# Patient Record
Sex: FEMALE | Race: BLACK OR AFRICAN AMERICAN | NOT HISPANIC OR LATINO | Employment: FULL TIME | URBAN - METROPOLITAN AREA
[De-identification: names, ages, dates, MRNs, and addresses within clinical notes are randomized per-mention and may not be internally consistent; named-entity substitution may affect disease eponyms.]

---

## 2023-03-20 ENCOUNTER — DOCUMENTATION (OUTPATIENT)
Dept: CARE COORDINATION | Facility: CLINIC | Age: 28
End: 2023-03-20
Payer: MEDICAID

## 2023-03-21 LAB
ALANINE AMINOTRANSFERASE (SGPT) (U/L) IN SER/PLAS: 21 U/L (ref 7–45)
ALBUMIN (G/DL) IN SER/PLAS: 3.3 G/DL (ref 3.4–5)
ALKALINE PHOSPHATASE (U/L) IN SER/PLAS: 42 U/L (ref 33–110)
ANION GAP IN SER/PLAS: 10 MMOL/L (ref 10–20)
ASPARTATE AMINOTRANSFERASE (SGOT) (U/L) IN SER/PLAS: 12 U/L (ref 9–39)
BASOPHILS (10*3/UL) IN BLOOD BY AUTOMATED COUNT: 0.02 X10E9/L (ref 0–0.1)
BASOPHILS/100 LEUKOCYTES IN BLOOD BY AUTOMATED COUNT: 0.5 % (ref 0–2)
BILIRUBIN TOTAL (MG/DL) IN SER/PLAS: 0.5 MG/DL (ref 0–1.2)
CALCIUM (MG/DL) IN SER/PLAS: 8.9 MG/DL (ref 8.6–10.3)
CARBON DIOXIDE, TOTAL (MMOL/L) IN SER/PLAS: 27 MMOL/L (ref 21–32)
CHLORIDE (MMOL/L) IN SER/PLAS: 102 MMOL/L (ref 98–107)
CREATININE (MG/DL) IN SER/PLAS: 0.56 MG/DL (ref 0.5–1.05)
EOSINOPHILS (10*3/UL) IN BLOOD BY AUTOMATED COUNT: 0.12 X10E9/L (ref 0–0.7)
EOSINOPHILS/100 LEUKOCYTES IN BLOOD BY AUTOMATED COUNT: 2.9 % (ref 0–6)
ERYTHROCYTE DISTRIBUTION WIDTH (RATIO) BY AUTOMATED COUNT: 15.1 % (ref 11.5–14.5)
ERYTHROCYTE MEAN CORPUSCULAR HEMOGLOBIN CONCENTRATION (G/DL) BY AUTOMATED: 31.7 G/DL (ref 32–36)
ERYTHROCYTE MEAN CORPUSCULAR VOLUME (FL) BY AUTOMATED COUNT: 88 FL (ref 80–100)
ERYTHROCYTES (10*6/UL) IN BLOOD BY AUTOMATED COUNT: 4.04 X10E12/L (ref 4–5.2)
GFR FEMALE: >90 ML/MIN/1.73M2
GLUCOSE (MG/DL) IN SER/PLAS: 83 MG/DL (ref 74–99)
HEMATOCRIT (%) IN BLOOD BY AUTOMATED COUNT: 35.7 % (ref 36–46)
HEMOGLOBIN (G/DL) IN BLOOD: 11.3 G/DL (ref 12–16)
IMMATURE GRANULOCYTES/100 LEUKOCYTES IN BLOOD BY AUTOMATED COUNT: 0.2 % (ref 0–0.9)
LEUKOCYTES (10*3/UL) IN BLOOD BY AUTOMATED COUNT: 4.1 X10E9/L (ref 4.4–11.3)
LYMPHOCYTES (10*3/UL) IN BLOOD BY AUTOMATED COUNT: 1.61 X10E9/L (ref 1.2–4.8)
LYMPHOCYTES/100 LEUKOCYTES IN BLOOD BY AUTOMATED COUNT: 39.1 % (ref 13–44)
MONOCYTES (10*3/UL) IN BLOOD BY AUTOMATED COUNT: 0.41 X10E9/L (ref 0.1–1)
MONOCYTES/100 LEUKOCYTES IN BLOOD BY AUTOMATED COUNT: 10 % (ref 2–10)
NEUTROPHILS (10*3/UL) IN BLOOD BY AUTOMATED COUNT: 1.95 X10E9/L (ref 1.2–7.7)
NEUTROPHILS/100 LEUKOCYTES IN BLOOD BY AUTOMATED COUNT: 47.3 % (ref 40–80)
PLATELETS (10*3/UL) IN BLOOD AUTOMATED COUNT: 462 X10E9/L (ref 150–450)
POTASSIUM (MMOL/L) IN SER/PLAS: 4.4 MMOL/L (ref 3.5–5.3)
PROTEIN TOTAL: 7.2 G/DL (ref 6.4–8.2)
SODIUM (MMOL/L) IN SER/PLAS: 135 MMOL/L (ref 136–145)
UREA NITROGEN (MG/DL) IN SER/PLAS: 8 MG/DL (ref 6–23)

## 2023-06-28 ENCOUNTER — APPOINTMENT (OUTPATIENT)
Dept: LAB | Facility: LAB | Age: 28
End: 2023-06-28
Payer: MEDICAID

## 2023-06-28 LAB
ALBUMIN (G/DL) IN SER/PLAS: 4.5 G/DL (ref 3.4–5)
ANION GAP IN SER/PLAS: 11 MMOL/L (ref 10–20)
ANTICARDIOLIPIN IGA ANTIBODY: 1.3 APL U/ML (ref 0–20)
ANTICARDIOLIPIN IGG ANTIBODY: 5.8 GPL U/ML (ref 0–20)
ANTICARDIOLIPIN IGM ANTIBODY: <0.2 MPL U/ML (ref 0–20)
BETA 2 GLYCOPROTEIN 1 IGA AB IN SERUM: <0.6 U/ML (ref 0–20)
BETA 2 GLYCOPROTEIN 1 IGG AB IN SERUM: <1.4 U/ML (ref 0–20)
BETA 2 GLYCOPROTEIN 1 IGM ANTIBODY IN SERUM: <0.2 U/ML (ref 0–20)
CALCIUM (MG/DL) IN SER/PLAS: 9.7 MG/DL (ref 8.6–10.6)
CARBON DIOXIDE, TOTAL (MMOL/L) IN SER/PLAS: 24 MMOL/L (ref 21–32)
CHLORIDE (MMOL/L) IN SER/PLAS: 107 MMOL/L (ref 98–107)
CREATININE (MG/DL) IN SER/PLAS: 0.79 MG/DL (ref 0.5–1.05)
GFR FEMALE: >90 ML/MIN/1.73M2
GLUCOSE (MG/DL) IN SER/PLAS: 89 MG/DL (ref 74–99)
PHOSPHATE (MG/DL) IN SER/PLAS: 4.3 MG/DL (ref 2.5–4.9)
POTASSIUM (MMOL/L) IN SER/PLAS: 3.9 MMOL/L (ref 3.5–5.3)
SODIUM (MMOL/L) IN SER/PLAS: 138 MMOL/L (ref 136–145)
UREA NITROGEN (MG/DL) IN SER/PLAS: 13 MG/DL (ref 6–23)

## 2023-06-29 LAB
DILUTE RUSSELL VIPER VENOM TIME CONF: 0.93 RATIO
DILUTE RUSSELL VIPER VENOM TIME SCREEN: 0.99 RATIO
DILUTE RUSSELL VIPER VENOM TIME TEST RATIO: 1.06 RATIO
LUPUS ANTICOAGULANT INTERPRETATION: NORMAL
NORMALIZED SILICA CLOTTING TIME (RATIO) OF PPP: 0.9 RATIO
SILICA CLOTTING TIME CONFIRMATION: 0.89 RATIO
SILICA CLOTTING TIME SCREEN: 0.81 RATIO

## 2023-09-22 PROBLEM — R76.0 LUPUS ANTICOAGULANT POSITIVE: Status: ACTIVE | Noted: 2023-09-22
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
## 2023-09-22 PROBLEM — D72.828 NEUTROPHILIC LEUKOCYTOSIS: Status: ACTIVE | Noted: 2023-09-22

## 2023-09-22 PROBLEM — R78.81 BACTEREMIA DUE TO GRAM-POSITIVE BACTERIA: Status: ACTIVE | Noted: 2023-09-22

## 2023-09-22 PROBLEM — I38 ENDOCARDITIS OF NATIVE VALVE: Status: ACTIVE | Noted: 2023-09-22
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
## 2023-09-22 PROBLEM — F11.90 OPIOID USE: Status: ACTIVE | Noted: 2023-03-03

## 2023-09-22 PROBLEM — I51.89 CARDIAC MASS: Status: ACTIVE | Noted: 2023-09-22

## 2023-09-22 PROBLEM — J18.9 CAP (COMMUNITY ACQUIRED PNEUMONIA): Status: ACTIVE | Noted: 2023-09-22

## 2023-09-22 PROBLEM — D72.9 NEUTROPHILIC LEUKOCYTOSIS: Status: ACTIVE | Noted: 2023-09-22

## 2023-09-22 PROBLEM — J90 PLEURAL EFFUSION: Status: ACTIVE | Noted: 2023-09-22

## 2023-09-22 PROBLEM — I51.3 INTRACARDIAC THROMBUS: Status: ACTIVE | Noted: 2023-09-22

## 2023-09-22 PROBLEM — Z78.9 USES BIRTH CONTROL: Status: ACTIVE | Noted: 2023-09-22

## 2023-09-22 PROBLEM — J03.90 TONSILLITIS: Status: ACTIVE | Noted: 2023-09-22

## 2023-09-22 PROBLEM — S99.919A ANKLE INJURY: Status: ACTIVE | Noted: 2023-09-22

## 2023-09-22 PROBLEM — Z04.9 CONDITION NOT FOUND: Status: ACTIVE | Noted: 2023-09-22

## 2023-09-22 PROBLEM — I33.0 BACTERIAL ENDOCARDITIS (HHS-HCC): Status: ACTIVE | Noted: 2023-09-22

## 2023-09-22 PROBLEM — Z79.2 LONG TERM (CURRENT) USE OF ANTIBIOTICS: Status: ACTIVE | Noted: 2023-03-03

## 2023-09-22 PROBLEM — D64.9 ANEMIA: Status: ACTIVE | Noted: 2023-03-03

## 2023-09-22 PROBLEM — F13.10: Status: ACTIVE | Noted: 2023-03-03

## 2023-09-22 PROBLEM — I26.99 PULMONARY EMBOLISM (MULTI): Status: ACTIVE | Noted: 2023-03-02

## 2023-09-22 PROBLEM — D72.829 ELEVATED WHITE BLOOD CELL COUNT, UNSPECIFIED: Status: ACTIVE | Noted: 2023-03-03

## 2023-09-22 RX ORDER — HEPARIN SODIUM,PORCINE/PF 10 UNIT/ML
SYRINGE (ML) INTRAVENOUS
COMMUNITY
Start: 2023-03-17

## 2023-09-22 RX ORDER — ACETAMINOPHEN 500 MG
2 TABLET ORAL EVERY 6 HOURS PRN
COMMUNITY

## 2023-09-22 RX ORDER — OXYCODONE HYDROCHLORIDE 5 MG/1
TABLET ORAL
COMMUNITY
Start: 2023-03-15

## 2023-09-22 RX ORDER — PENICILLIN G 2000000 [IU]/50ML
INJECTION, SOLUTION INTRAVENOUS EVERY 4 HOURS
COMMUNITY
Start: 2023-03-17

## 2023-09-22 RX ORDER — PENICILLIN G POTASSIUM 20000000 [IU]/1
POWDER, FOR SOLUTION INTRAVENOUS
COMMUNITY
Start: 2023-03-20

## 2023-09-22 RX ORDER — MEDROXYPROGESTERONE ACETATE 150 MG/ML
1 INJECTION, SUSPENSION INTRAMUSCULAR
COMMUNITY
Start: 2023-04-27 | End: 2023-10-23 | Stop reason: SDUPTHER

## 2023-09-22 RX ORDER — OXYCODONE HYDROCHLORIDE 5 MG/1
1 TABLET ORAL 2 TIMES DAILY
COMMUNITY
Start: 2023-03-17

## 2023-09-22 RX ORDER — SODIUM CHLORIDE 0.9 % (FLUSH) 0.9 %
SYRINGE (ML) INJECTION
COMMUNITY
Start: 2023-03-17

## 2023-09-22 RX ORDER — ENOXAPARIN SODIUM 150 MG/ML
140 INJECTION SUBCUTANEOUS 2 TIMES DAILY
COMMUNITY
Start: 2023-05-12

## 2023-09-22 RX ORDER — LEVONORGESTREL AND ETHINYL ESTRADIOL 0.15-0.03
1 KIT ORAL DAILY
COMMUNITY
Start: 2023-04-12

## 2023-09-22 RX ORDER — NORETHINDRONE ACETATE AND ETHINYL ESTRADIOL 1MG-20(21)
1 KIT ORAL DAILY
COMMUNITY
Start: 2014-04-09

## 2023-09-22 RX ORDER — LEVONORGESTREL / ETHINYL ESTRADIOL AND ETHINYL ESTRADIOL 150-30(84)
1 KIT ORAL DAILY
COMMUNITY

## 2023-10-23 ENCOUNTER — APPOINTMENT (OUTPATIENT)
Dept: OBSTETRICS AND GYNECOLOGY | Facility: CLINIC | Age: 28
End: 2023-10-23
Payer: MEDICAID

## 2023-10-23 DIAGNOSIS — Z78.9 USES BIRTH CONTROL: Primary | ICD-10-CM

## 2023-10-23 RX ORDER — MEDROXYPROGESTERONE ACETATE 150 MG/ML
150 INJECTION, SUSPENSION INTRAMUSCULAR
Qty: 1 ML | Refills: 3 | Status: SHIPPED | OUTPATIENT
Start: 2023-10-23 | End: 2023-11-07 | Stop reason: SDUPTHER

## 2023-10-25 ENCOUNTER — TELEPHONE (OUTPATIENT)
Dept: OBSTETRICS AND GYNECOLOGY | Facility: CLINIC | Age: 28
End: 2023-10-25
Payer: MEDICAID

## 2023-11-02 NOTE — TELEPHONE ENCOUNTER
Spoke to patient on the phone.  Patient states she does get breakthrough bleeding on Depo-Provera.  Now on Xarelto due to history of blood clots.  We discussed limited option of birth control due to history of blood clots.  I did review progesterone only birth control and its many different forms along with nonhormonal IUD.  Patient would prefer not to use IUD and would like to stay on the Depo-Provera.  Prescription sent to her pharmacy on file.

## 2023-11-07 DIAGNOSIS — Z78.9 USES BIRTH CONTROL: ICD-10-CM

## 2023-11-07 RX ORDER — MEDROXYPROGESTERONE ACETATE 150 MG/ML
150 INJECTION, SUSPENSION INTRAMUSCULAR
Qty: 1 ML | Refills: 3 | Status: SHIPPED | OUTPATIENT
Start: 2023-11-07

## 2025-06-25 ENCOUNTER — APPOINTMENT (OUTPATIENT)
Dept: RADIOLOGY | Facility: HOSPITAL | Age: 30
End: 2025-06-25
Payer: MEDICAID

## 2025-06-25 ENCOUNTER — HOSPITAL ENCOUNTER (INPATIENT)
Facility: HOSPITAL | Age: 30
End: 2025-06-25
Attending: STUDENT IN AN ORGANIZED HEALTH CARE EDUCATION/TRAINING PROGRAM
Payer: MEDICAID

## 2025-06-25 ENCOUNTER — APPOINTMENT (OUTPATIENT)
Dept: CARDIOLOGY | Facility: HOSPITAL | Age: 30
End: 2025-06-25
Payer: MEDICAID

## 2025-06-25 DIAGNOSIS — R07.9 CHEST PAIN, UNSPECIFIED TYPE: ICD-10-CM

## 2025-06-25 DIAGNOSIS — R76.0 LUPUS ANTICOAGULANT POSITIVE: ICD-10-CM

## 2025-06-25 DIAGNOSIS — F32.A DEPRESSION, UNSPECIFIED DEPRESSION TYPE: ICD-10-CM

## 2025-06-25 DIAGNOSIS — J02.9 ACUTE PHARYNGITIS, UNSPECIFIED ETIOLOGY: ICD-10-CM

## 2025-06-25 DIAGNOSIS — I51.3 INTRACARDIAC THROMBUS: ICD-10-CM

## 2025-06-25 DIAGNOSIS — I33.0 BACTERIAL ENDOCARDITIS, UNSPECIFIED CHRONICITY (HHS-HCC): ICD-10-CM

## 2025-06-25 DIAGNOSIS — J18.9 PNEUMONIA OF RIGHT MIDDLE LOBE DUE TO INFECTIOUS ORGANISM: Primary | ICD-10-CM

## 2025-06-25 DIAGNOSIS — R79.89 ELEVATED TROPONIN: ICD-10-CM

## 2025-06-25 DIAGNOSIS — R06.02 SHORTNESS OF BREATH: ICD-10-CM

## 2025-06-25 LAB
ALBUMIN SERPL BCP-MCNC: 3.5 G/DL (ref 3.4–5)
ALP SERPL-CCNC: 97 U/L (ref 33–110)
ALT SERPL W P-5'-P-CCNC: 20 U/L (ref 7–45)
ANION GAP BLDV CALCULATED.4IONS-SCNC: 9 MMOL/L (ref 10–25)
ANION GAP SERPL CALC-SCNC: 16 MMOL/L (ref 10–20)
APPEARANCE UR: ABNORMAL
AST SERPL W P-5'-P-CCNC: 11 U/L (ref 9–39)
ATRIAL RATE: 131 BPM
B-HCG SERPL-ACNC: 2 MIU/ML
BASE EXCESS BLDV CALC-SCNC: 1.6 MMOL/L (ref -2–3)
BASOPHILS # BLD MANUAL: 0 X10*3/UL (ref 0–0.1)
BASOPHILS NFR BLD MANUAL: 0 %
BILIRUB SERPL-MCNC: 0.8 MG/DL (ref 0–1.2)
BILIRUB UR STRIP.AUTO-MCNC: ABNORMAL MG/DL
BNP SERPL-MCNC: 60 PG/ML (ref 0–99)
BODY TEMPERATURE: 37 DEGREES CELSIUS
BUN SERPL-MCNC: 8 MG/DL (ref 6–23)
CA-I BLDV-SCNC: 1.25 MMOL/L (ref 1.1–1.33)
CALCIUM SERPL-MCNC: 9.3 MG/DL (ref 8.6–10.3)
CARDIAC TROPONIN I PNL SERPL HS: 35 NG/L (ref 0–13)
CARDIAC TROPONIN I PNL SERPL HS: 4 NG/L (ref 0–13)
CHLORIDE BLDV-SCNC: 100 MMOL/L (ref 98–107)
CHLORIDE SERPL-SCNC: 100 MMOL/L (ref 98–107)
CO2 SERPL-SCNC: 20 MMOL/L (ref 21–32)
COLOR UR: YELLOW
CREAT SERPL-MCNC: 0.66 MG/DL (ref 0.5–1.05)
D DIMER PPP FEU-MCNC: 2313 NG/ML FEU
EGFRCR SERPLBLD CKD-EPI 2021: >90 ML/MIN/1.73M*2
EOSINOPHIL # BLD MANUAL: 0 X10*3/UL (ref 0–0.7)
EOSINOPHIL NFR BLD MANUAL: 0 %
ERYTHROCYTE [DISTWIDTH] IN BLOOD BY AUTOMATED COUNT: 14.3 % (ref 11.5–14.5)
FLUAV RNA RESP QL NAA+PROBE: NOT DETECTED
FLUBV RNA RESP QL NAA+PROBE: NOT DETECTED
GLUCOSE BLDV-MCNC: 132 MG/DL (ref 74–99)
GLUCOSE SERPL-MCNC: 142 MG/DL (ref 74–99)
GLUCOSE UR STRIP.AUTO-MCNC: ABNORMAL MG/DL
HCG UR QL IA.RAPID: NEGATIVE
HCO3 BLDV-SCNC: 24.6 MMOL/L (ref 22–26)
HCT VFR BLD AUTO: 42.6 % (ref 36–46)
HCT VFR BLD EST: 43 % (ref 36–46)
HGB BLD-MCNC: 14 G/DL (ref 12–16)
HGB BLDV-MCNC: 14.3 G/DL (ref 12–16)
IMM GRANULOCYTES # BLD AUTO: 0.33 X10*3/UL (ref 0–0.7)
IMM GRANULOCYTES NFR BLD AUTO: 1.4 % (ref 0–0.9)
INHALED O2 CONCENTRATION: 21 %
KETONES UR STRIP.AUTO-MCNC: ABNORMAL MG/DL
LACTATE BLDV-SCNC: 1.2 MMOL/L (ref 0.4–2)
LACTATE SERPL-SCNC: 1.3 MMOL/L (ref 0.4–2)
LACTATE SERPL-SCNC: 2.2 MMOL/L (ref 0.4–2)
LEUKOCYTE ESTERASE UR QL STRIP.AUTO: ABNORMAL
LYMPHOCYTES # BLD MANUAL: 1.42 X10*3/UL (ref 1.2–4.8)
LYMPHOCYTES NFR BLD MANUAL: 6 %
MAGNESIUM SERPL-MCNC: 1.93 MG/DL (ref 1.6–2.4)
MCH RBC QN AUTO: 29.2 PG (ref 26–34)
MCHC RBC AUTO-ENTMCNC: 32.9 G/DL (ref 32–36)
MCV RBC AUTO: 89 FL (ref 80–100)
MONOCYTES # BLD MANUAL: 0 X10*3/UL (ref 0.1–1)
MONOCYTES NFR BLD MANUAL: 0 %
MUCOUS THREADS #/AREA URNS AUTO: ABNORMAL /LPF
NEUTS SEG # BLD MANUAL: 22.28 X10*3/UL (ref 1.2–7)
NEUTS SEG NFR BLD MANUAL: 94 %
NITRITE UR QL STRIP.AUTO: NEGATIVE
NRBC BLD-RTO: 0 /100 WBCS (ref 0–0)
OXYHGB MFR BLDV: 91.2 % (ref 45–75)
P AXIS: 59 DEGREES
P OFFSET: 204 MS
P ONSET: 150 MS
PCO2 BLDV: 33 MM HG (ref 41–51)
PH BLDV: 7.48 PH (ref 7.33–7.43)
PH UR STRIP.AUTO: 6.5 [PH]
PLATELET # BLD AUTO: 288 X10*3/UL (ref 150–450)
PO2 BLDV: 69 MM HG (ref 35–45)
POTASSIUM BLDV-SCNC: 3 MMOL/L (ref 3.5–5.3)
POTASSIUM SERPL-SCNC: 3.1 MMOL/L (ref 3.5–5.3)
PR INTERVAL: 136 MS
PROT SERPL-MCNC: 7.3 G/DL (ref 6.4–8.2)
PROT UR STRIP.AUTO-MCNC: ABNORMAL MG/DL
Q ONSET: 218 MS
QRS COUNT: 22 BEATS
QRS DURATION: 74 MS
QT INTERVAL: 304 MS
QTC CALCULATION(BAZETT): 448 MS
QTC FREDERICIA: 394 MS
R AXIS: 33 DEGREES
RBC # BLD AUTO: 4.79 X10*6/UL (ref 4–5.2)
RBC # UR STRIP.AUTO: ABNORMAL MG/DL
RBC #/AREA URNS AUTO: ABNORMAL /HPF
RBC MORPH BLD: ABNORMAL
SAO2 % BLDV: 94 % (ref 45–75)
SARS-COV-2 RNA RESP QL NAA+PROBE: NOT DETECTED
SODIUM BLDV-SCNC: 131 MMOL/L (ref 136–145)
SODIUM SERPL-SCNC: 133 MMOL/L (ref 136–145)
SP GR UR STRIP.AUTO: 1.03
SQUAMOUS #/AREA URNS AUTO: ABNORMAL /HPF
T AXIS: -16 DEGREES
T OFFSET: 370 MS
TOTAL CELLS COUNTED BLD: 100
UROBILINOGEN UR STRIP.AUTO-MCNC: ABNORMAL MG/DL
VENTRICULAR RATE: 131 BPM
WBC # BLD AUTO: 23.7 X10*3/UL (ref 4.4–11.3)
WBC #/AREA URNS AUTO: ABNORMAL /HPF

## 2025-06-25 PROCEDURE — 2500000001 HC RX 250 WO HCPCS SELF ADMINISTERED DRUGS (ALT 637 FOR MEDICARE OP): Performed by: STUDENT IN AN ORGANIZED HEALTH CARE EDUCATION/TRAINING PROGRAM

## 2025-06-25 PROCEDURE — 1200000002 HC GENERAL ROOM WITH TELEMETRY DAILY

## 2025-06-25 PROCEDURE — 78830 RP LOCLZJ TUM SPECT W/CT 1: CPT | Performed by: INTERNAL MEDICINE

## 2025-06-25 PROCEDURE — 96375 TX/PRO/DX INJ NEW DRUG ADDON: CPT

## 2025-06-25 PROCEDURE — 93970 EXTREMITY STUDY: CPT

## 2025-06-25 PROCEDURE — 84145 PROCALCITONIN (PCT): CPT | Mod: AHULAB

## 2025-06-25 PROCEDURE — 71275 CT ANGIOGRAPHY CHEST: CPT | Mod: FOREIGN READ | Performed by: RADIOLOGY

## 2025-06-25 PROCEDURE — 84702 CHORIONIC GONADOTROPIN TEST: CPT | Performed by: STUDENT IN AN ORGANIZED HEALTH CARE EDUCATION/TRAINING PROGRAM

## 2025-06-25 PROCEDURE — 2500000004 HC RX 250 GENERAL PHARMACY W/ HCPCS (ALT 636 FOR OP/ED): Performed by: STUDENT IN AN ORGANIZED HEALTH CARE EDUCATION/TRAINING PROGRAM

## 2025-06-25 PROCEDURE — 93005 ELECTROCARDIOGRAM TRACING: CPT

## 2025-06-25 PROCEDURE — 3430000001 HC RX 343 DIAGNOSTIC RADIOPHARMACEUTICALS: Mod: JZ | Performed by: STUDENT IN AN ORGANIZED HEALTH CARE EDUCATION/TRAINING PROGRAM

## 2025-06-25 PROCEDURE — 87899 AGENT NOS ASSAY W/OPTIC: CPT | Mod: AHULAB

## 2025-06-25 PROCEDURE — 9420000001 HC RT PATIENT EDUCATION 5 MIN

## 2025-06-25 PROCEDURE — 85379 FIBRIN DEGRADATION QUANT: CPT | Performed by: STUDENT IN AN ORGANIZED HEALTH CARE EDUCATION/TRAINING PROGRAM

## 2025-06-25 PROCEDURE — A9540 TC99M MAA: HCPCS | Mod: JZ | Performed by: STUDENT IN AN ORGANIZED HEALTH CARE EDUCATION/TRAINING PROGRAM

## 2025-06-25 PROCEDURE — 87154 CUL TYP ID BLD PTHGN 6+ TRGT: CPT | Mod: AHULAB | Performed by: STUDENT IN AN ORGANIZED HEALTH CARE EDUCATION/TRAINING PROGRAM

## 2025-06-25 PROCEDURE — 84484 ASSAY OF TROPONIN QUANT: CPT | Performed by: STUDENT IN AN ORGANIZED HEALTH CARE EDUCATION/TRAINING PROGRAM

## 2025-06-25 PROCEDURE — 2500000002 HC RX 250 W HCPCS SELF ADMINISTERED DRUGS (ALT 637 FOR MEDICARE OP, ALT 636 FOR OP/ED)

## 2025-06-25 PROCEDURE — 99223 1ST HOSP IP/OBS HIGH 75: CPT

## 2025-06-25 PROCEDURE — 99285 EMERGENCY DEPT VISIT HI MDM: CPT | Mod: 25 | Performed by: STUDENT IN AN ORGANIZED HEALTH CARE EDUCATION/TRAINING PROGRAM

## 2025-06-25 PROCEDURE — 83605 ASSAY OF LACTIC ACID: CPT | Performed by: STUDENT IN AN ORGANIZED HEALTH CARE EDUCATION/TRAINING PROGRAM

## 2025-06-25 PROCEDURE — 2500000001 HC RX 250 WO HCPCS SELF ADMINISTERED DRUGS (ALT 637 FOR MEDICARE OP)

## 2025-06-25 PROCEDURE — 87449 NOS EACH ORGANISM AG IA: CPT | Mod: AHULAB

## 2025-06-25 PROCEDURE — 87636 SARSCOV2 & INF A&B AMP PRB: CPT | Performed by: STUDENT IN AN ORGANIZED HEALTH CARE EDUCATION/TRAINING PROGRAM

## 2025-06-25 PROCEDURE — 84132 ASSAY OF SERUM POTASSIUM: CPT | Performed by: STUDENT IN AN ORGANIZED HEALTH CARE EDUCATION/TRAINING PROGRAM

## 2025-06-25 PROCEDURE — 2550000001 HC RX 255 CONTRASTS: Performed by: STUDENT IN AN ORGANIZED HEALTH CARE EDUCATION/TRAINING PROGRAM

## 2025-06-25 PROCEDURE — 85007 BL SMEAR W/DIFF WBC COUNT: CPT | Performed by: STUDENT IN AN ORGANIZED HEALTH CARE EDUCATION/TRAINING PROGRAM

## 2025-06-25 PROCEDURE — 71275 CT ANGIOGRAPHY CHEST: CPT

## 2025-06-25 PROCEDURE — 36415 COLL VENOUS BLD VENIPUNCTURE: CPT | Performed by: STUDENT IN AN ORGANIZED HEALTH CARE EDUCATION/TRAINING PROGRAM

## 2025-06-25 PROCEDURE — 87207 SMEAR SPECIAL STAIN: CPT | Mod: AHULAB | Performed by: STUDENT IN AN ORGANIZED HEALTH CARE EDUCATION/TRAINING PROGRAM

## 2025-06-25 PROCEDURE — 87077 CULTURE AEROBIC IDENTIFY: CPT | Mod: AHULAB | Performed by: STUDENT IN AN ORGANIZED HEALTH CARE EDUCATION/TRAINING PROGRAM

## 2025-06-25 PROCEDURE — 78830 RP LOCLZJ TUM SPECT W/CT 1: CPT

## 2025-06-25 PROCEDURE — 96361 HYDRATE IV INFUSION ADD-ON: CPT

## 2025-06-25 PROCEDURE — 87081 CULTURE SCREEN ONLY: CPT | Mod: AHULAB | Performed by: STUDENT IN AN ORGANIZED HEALTH CARE EDUCATION/TRAINING PROGRAM

## 2025-06-25 PROCEDURE — 93970 EXTREMITY STUDY: CPT | Performed by: RADIOLOGY

## 2025-06-25 PROCEDURE — 83735 ASSAY OF MAGNESIUM: CPT | Performed by: STUDENT IN AN ORGANIZED HEALTH CARE EDUCATION/TRAINING PROGRAM

## 2025-06-25 PROCEDURE — 85027 COMPLETE CBC AUTOMATED: CPT | Performed by: STUDENT IN AN ORGANIZED HEALTH CARE EDUCATION/TRAINING PROGRAM

## 2025-06-25 PROCEDURE — 96365 THER/PROPH/DIAG IV INF INIT: CPT

## 2025-06-25 PROCEDURE — 83880 ASSAY OF NATRIURETIC PEPTIDE: CPT | Performed by: STUDENT IN AN ORGANIZED HEALTH CARE EDUCATION/TRAINING PROGRAM

## 2025-06-25 PROCEDURE — 81025 URINE PREGNANCY TEST: CPT | Performed by: STUDENT IN AN ORGANIZED HEALTH CARE EDUCATION/TRAINING PROGRAM

## 2025-06-25 PROCEDURE — 81001 URINALYSIS AUTO W/SCOPE: CPT | Performed by: STUDENT IN AN ORGANIZED HEALTH CARE EDUCATION/TRAINING PROGRAM

## 2025-06-25 PROCEDURE — 87086 URINE CULTURE/COLONY COUNT: CPT | Mod: AHULAB | Performed by: STUDENT IN AN ORGANIZED HEALTH CARE EDUCATION/TRAINING PROGRAM

## 2025-06-25 PROCEDURE — 2500000004 HC RX 250 GENERAL PHARMACY W/ HCPCS (ALT 636 FOR OP/ED)

## 2025-06-25 RX ORDER — POLYETHYLENE GLYCOL 3350 17 G/17G
17 POWDER, FOR SOLUTION ORAL DAILY
Status: DISCONTINUED | OUTPATIENT
Start: 2025-06-25 | End: 2025-07-01 | Stop reason: HOSPADM

## 2025-06-25 RX ORDER — ENOXAPARIN SODIUM 100 MG/ML
40 INJECTION SUBCUTANEOUS ONCE
Status: DISCONTINUED | OUTPATIENT
Start: 2025-06-25 | End: 2025-06-25

## 2025-06-25 RX ORDER — ENOXAPARIN SODIUM 100 MG/ML
40 INJECTION SUBCUTANEOUS EVERY 12 HOURS SCHEDULED
Status: DISCONTINUED | OUTPATIENT
Start: 2025-06-25 | End: 2025-07-01 | Stop reason: HOSPADM

## 2025-06-25 RX ORDER — ACETAMINOPHEN 325 MG/1
975 TABLET ORAL ONCE
Status: COMPLETED | OUTPATIENT
Start: 2025-06-25 | End: 2025-06-25

## 2025-06-25 RX ORDER — KETOROLAC TROMETHAMINE 30 MG/ML
15 INJECTION, SOLUTION INTRAMUSCULAR; INTRAVENOUS ONCE
Status: COMPLETED | OUTPATIENT
Start: 2025-06-25 | End: 2025-06-25

## 2025-06-25 RX ORDER — POTASSIUM CHLORIDE 20 MEQ/1
40 TABLET, EXTENDED RELEASE ORAL ONCE
Status: COMPLETED | OUTPATIENT
Start: 2025-06-25 | End: 2025-06-25

## 2025-06-25 RX ORDER — ACETAMINOPHEN 325 MG/1
975 TABLET ORAL EVERY 8 HOURS PRN
Status: COMPLETED | OUTPATIENT
Start: 2025-06-25 | End: 2025-06-27

## 2025-06-25 RX ORDER — ONDANSETRON HYDROCHLORIDE 2 MG/ML
4 INJECTION, SOLUTION INTRAVENOUS EVERY 4 HOURS PRN
Status: DISCONTINUED | OUTPATIENT
Start: 2025-06-25 | End: 2025-07-01 | Stop reason: HOSPADM

## 2025-06-25 RX ORDER — GUAIFENESIN/DEXTROMETHORPHAN 100-10MG/5
5 SYRUP ORAL EVERY 4 HOURS PRN
Status: DISCONTINUED | OUTPATIENT
Start: 2025-06-25 | End: 2025-07-01 | Stop reason: HOSPADM

## 2025-06-25 RX ORDER — CEFTRIAXONE 1 G/50ML
1 INJECTION, SOLUTION INTRAVENOUS ONCE
Status: COMPLETED | OUTPATIENT
Start: 2025-06-25 | End: 2025-06-25

## 2025-06-25 RX ORDER — ENOXAPARIN SODIUM 150 MG/ML
1 INJECTION SUBCUTANEOUS ONCE
Status: DISCONTINUED | OUTPATIENT
Start: 2025-06-25 | End: 2025-06-25

## 2025-06-25 RX ADMIN — ACETAMINOPHEN 975 MG: 325 TABLET ORAL at 20:19

## 2025-06-25 RX ADMIN — KIT FOR THE PREPARATION OF TECHNETIUM TC 99M ALBUMIN AGGREGATED 4.2 MILLICURIE: 2.5 INJECTION, POWDER, FOR SOLUTION INTRAVENOUS at 16:24

## 2025-06-25 RX ADMIN — DOXYCYCLINE 100 MG: 100 INJECTION, POWDER, LYOPHILIZED, FOR SOLUTION INTRAVENOUS at 17:23

## 2025-06-25 RX ADMIN — SODIUM CHLORIDE 1000 ML: 0.9 INJECTION, SOLUTION INTRAVENOUS at 14:31

## 2025-06-25 RX ADMIN — KETOROLAC TROMETHAMINE 15 MG: 30 INJECTION, SOLUTION INTRAMUSCULAR at 11:17

## 2025-06-25 RX ADMIN — CEFTRIAXONE 1 G: 1 INJECTION, SOLUTION INTRAVENOUS at 15:46

## 2025-06-25 RX ADMIN — SODIUM CHLORIDE 1000 ML: 0.9 INJECTION, SOLUTION INTRAVENOUS at 11:16

## 2025-06-25 RX ADMIN — POTASSIUM CHLORIDE 40 MEQ: 1500 TABLET, EXTENDED RELEASE ORAL at 18:23

## 2025-06-25 RX ADMIN — ACETAMINOPHEN 975 MG: 325 TABLET ORAL at 11:16

## 2025-06-25 RX ADMIN — POLYETHYLENE GLYCOL 3350 17 G: 17 POWDER, FOR SOLUTION ORAL at 20:19

## 2025-06-25 RX ADMIN — IOHEXOL 75 ML: 350 INJECTION, SOLUTION INTRAVENOUS at 13:32

## 2025-06-25 RX ADMIN — ENOXAPARIN SODIUM 40 MG: 40 INJECTION SUBCUTANEOUS at 20:19

## 2025-06-25 SDOH — ECONOMIC STABILITY: HOUSING INSECURITY: AT ANY TIME IN THE PAST 12 MONTHS, WERE YOU HOMELESS OR LIVING IN A SHELTER (INCLUDING NOW)?: NO

## 2025-06-25 SDOH — ECONOMIC STABILITY: HOUSING INSECURITY: IN THE LAST 12 MONTHS, WAS THERE A TIME WHEN YOU WERE NOT ABLE TO PAY THE MORTGAGE OR RENT ON TIME?: NO

## 2025-06-25 SDOH — ECONOMIC STABILITY: HOUSING INSECURITY: IN THE PAST 12 MONTHS, HOW MANY TIMES HAVE YOU MOVED WHERE YOU WERE LIVING?: 0

## 2025-06-25 SDOH — ECONOMIC STABILITY: FOOD INSECURITY: WITHIN THE PAST 12 MONTHS, YOU WORRIED THAT YOUR FOOD WOULD RUN OUT BEFORE YOU GOT THE MONEY TO BUY MORE.: NEVER TRUE

## 2025-06-25 SDOH — SOCIAL STABILITY: SOCIAL INSECURITY: ARE YOU OR HAVE YOU BEEN THREATENED OR ABUSED PHYSICALLY, EMOTIONALLY, OR SEXUALLY BY ANYONE?: NO

## 2025-06-25 SDOH — SOCIAL STABILITY: SOCIAL INSECURITY
WITHIN THE LAST YEAR, HAVE YOU BEEN KICKED, HIT, SLAPPED, OR OTHERWISE PHYSICALLY HURT BY YOUR PARTNER OR EX-PARTNER?: NO

## 2025-06-25 SDOH — SOCIAL STABILITY: SOCIAL INSECURITY: HAVE YOU HAD THOUGHTS OF HARMING ANYONE ELSE?: NO

## 2025-06-25 SDOH — ECONOMIC STABILITY: INCOME INSECURITY: IN THE PAST 12 MONTHS HAS THE ELECTRIC, GAS, OIL, OR WATER COMPANY THREATENED TO SHUT OFF SERVICES IN YOUR HOME?: NO

## 2025-06-25 SDOH — SOCIAL STABILITY: SOCIAL INSECURITY: DOES ANYONE TRY TO KEEP YOU FROM HAVING/CONTACTING OTHER FRIENDS OR DOING THINGS OUTSIDE YOUR HOME?: NO

## 2025-06-25 SDOH — SOCIAL STABILITY: SOCIAL INSECURITY: WITHIN THE LAST YEAR, HAVE YOU BEEN AFRAID OF YOUR PARTNER OR EX-PARTNER?: NO

## 2025-06-25 SDOH — SOCIAL STABILITY: SOCIAL INSECURITY: WITHIN THE LAST YEAR, HAVE YOU BEEN HUMILIATED OR EMOTIONALLY ABUSED IN OTHER WAYS BY YOUR PARTNER OR EX-PARTNER?: NO

## 2025-06-25 SDOH — SOCIAL STABILITY: SOCIAL INSECURITY
WITHIN THE LAST YEAR, HAVE YOU BEEN RAPED OR FORCED TO HAVE ANY KIND OF SEXUAL ACTIVITY BY YOUR PARTNER OR EX-PARTNER?: NO

## 2025-06-25 SDOH — SOCIAL STABILITY: SOCIAL INSECURITY: HAS ANYONE EVER THREATENED TO HURT YOUR FAMILY OR YOUR PETS?: NO

## 2025-06-25 SDOH — ECONOMIC STABILITY: FOOD INSECURITY: HOW HARD IS IT FOR YOU TO PAY FOR THE VERY BASICS LIKE FOOD, HOUSING, MEDICAL CARE, AND HEATING?: NOT HARD AT ALL

## 2025-06-25 SDOH — SOCIAL STABILITY: SOCIAL INSECURITY: ARE THERE ANY APPARENT SIGNS OF INJURIES/BEHAVIORS THAT COULD BE RELATED TO ABUSE/NEGLECT?: NO

## 2025-06-25 SDOH — SOCIAL STABILITY: SOCIAL INSECURITY: DO YOU FEEL ANYONE HAS EXPLOITED OR TAKEN ADVANTAGE OF YOU FINANCIALLY OR OF YOUR PERSONAL PROPERTY?: NO

## 2025-06-25 SDOH — SOCIAL STABILITY: SOCIAL INSECURITY: ABUSE: ADULT

## 2025-06-25 SDOH — ECONOMIC STABILITY: FOOD INSECURITY: WITHIN THE PAST 12 MONTHS, THE FOOD YOU BOUGHT JUST DIDN'T LAST AND YOU DIDN'T HAVE MONEY TO GET MORE.: NEVER TRUE

## 2025-06-25 SDOH — SOCIAL STABILITY: SOCIAL INSECURITY: DO YOU FEEL UNSAFE GOING BACK TO THE PLACE WHERE YOU ARE LIVING?: NO

## 2025-06-25 SDOH — SOCIAL STABILITY: SOCIAL INSECURITY: WERE YOU ABLE TO COMPLETE ALL THE BEHAVIORAL HEALTH SCREENINGS?: YES

## 2025-06-25 SDOH — ECONOMIC STABILITY: TRANSPORTATION INSECURITY: IN THE PAST 12 MONTHS, HAS LACK OF TRANSPORTATION KEPT YOU FROM MEDICAL APPOINTMENTS OR FROM GETTING MEDICATIONS?: NO

## 2025-06-25 ASSESSMENT — PAIN DESCRIPTION - DESCRIPTORS
DESCRIPTORS: SORE
DESCRIPTORS: BURNING
DESCRIPTORS: ACHING

## 2025-06-25 ASSESSMENT — ACTIVITIES OF DAILY LIVING (ADL)
BATHING: INDEPENDENT
FEEDING YOURSELF: INDEPENDENT
GROOMING: INDEPENDENT
HEARING - RIGHT EAR: FUNCTIONAL
TOILETING: INDEPENDENT
PATIENT'S MEMORY ADEQUATE TO SAFELY COMPLETE DAILY ACTIVITIES?: YES
ADEQUATE_TO_COMPLETE_ADL: YES
WALKS IN HOME: INDEPENDENT
DRESSING YOURSELF: INDEPENDENT
HEARING - LEFT EAR: FUNCTIONAL
LACK_OF_TRANSPORTATION: NO
JUDGMENT_ADEQUATE_SAFELY_COMPLETE_DAILY_ACTIVITIES: YES
LACK_OF_TRANSPORTATION: NO

## 2025-06-25 ASSESSMENT — PATIENT HEALTH QUESTIONNAIRE - PHQ9
1. LITTLE INTEREST OR PLEASURE IN DOING THINGS: NOT AT ALL
2. FEELING DOWN, DEPRESSED OR HOPELESS: NOT AT ALL
SUM OF ALL RESPONSES TO PHQ9 QUESTIONS 1 & 2: 0

## 2025-06-25 ASSESSMENT — COGNITIVE AND FUNCTIONAL STATUS - GENERAL
DAILY ACTIVITIY SCORE: 24
DAILY ACTIVITIY SCORE: 24
MOBILITY SCORE: 24
DAILY ACTIVITIY SCORE: 24
MOBILITY SCORE: 24
MOBILITY SCORE: 24
PATIENT BASELINE BEDBOUND: NO

## 2025-06-25 ASSESSMENT — PAIN DESCRIPTION - LOCATION
LOCATION: THROAT
LOCATION: CHEST

## 2025-06-25 ASSESSMENT — PAIN - FUNCTIONAL ASSESSMENT
PAIN_FUNCTIONAL_ASSESSMENT: 0-10

## 2025-06-25 ASSESSMENT — LIFESTYLE VARIABLES
AUDIT-C TOTAL SCORE: 5
HOW OFTEN DO YOU HAVE 6 OR MORE DRINKS ON ONE OCCASION: LESS THAN MONTHLY
HAS A RELATIVE, FRIEND, DOCTOR, OR ANOTHER HEALTH PROFESSIONAL EXPRESSED CONCERN ABOUT YOUR DRINKING OR SUGGESTED YOU CUT DOWN: NO
SKIP TO QUESTIONS 9-10: 0
AUDIT-C TOTAL SCORE: 5
HOW OFTEN DURING THE LAST YEAR HAVE YOU NEEDED AN ALCOHOLIC DRINK FIRST THING IN THE MORNING TO GET YOURSELF GOING AFTER A NIGHT OF HEAVY DRINKING: NEVER
HOW OFTEN DURING THE LAST YEAR HAVE YOU BEEN UNABLE TO REMEMBER WHAT HAPPENED THE NIGHT BEFORE BECAUSE YOU HAD BEEN DRINKING: NEVER
HOW OFTEN DURING THE LAST YEAR HAVE YOU FAILED TO DO WHAT WAS NORMALLY EXPECTED FROM YOU BECAUSE OF DRINKING: NEVER
HOW OFTEN DURING THE LAST YEAR HAVE YOU FOUND THAT YOU WERE NOT ABLE TO STOP DRINKING ONCE YOU HAD STARTED: NEVER
AUDIT TOTAL SCORE: 0
HOW OFTEN DURING THE LAST YEAR HAVE YOU HAD A FEELING OF GUILT OR REMORSE AFTER DRINKING: NEVER
HOW MANY STANDARD DRINKS CONTAINING ALCOHOL DO YOU HAVE ON A TYPICAL DAY: 1 OR 2
AUDIT TOTAL SCORE: 5
HOW OFTEN DO YOU HAVE A DRINK CONTAINING ALCOHOL: 4 OR MORE TIMES A WEEK
HAVE YOU OR SOMEONE ELSE BEEN INJURED AS A RESULT OF YOUR DRINKING: NO

## 2025-06-25 ASSESSMENT — PAIN DESCRIPTION - FREQUENCY: FREQUENCY: CONSTANT/CONTINUOUS

## 2025-06-25 ASSESSMENT — PAIN DESCRIPTION - PROGRESSION: CLINICAL_PROGRESSION: GRADUALLY WORSENING

## 2025-06-25 ASSESSMENT — PAIN DESCRIPTION - ONSET: ONSET: GRADUAL

## 2025-06-25 ASSESSMENT — PAIN SCALES - GENERAL
PAINLEVEL_OUTOF10: 8
PAINLEVEL_OUTOF10: 8
PAINLEVEL_OUTOF10: 10 - WORST POSSIBLE PAIN
PAINLEVEL_OUTOF10: 8

## 2025-06-25 ASSESSMENT — PAIN DESCRIPTION - PAIN TYPE: TYPE: ACUTE PAIN

## 2025-06-25 NOTE — CARE PLAN
The patient's goals for the shift include      The clinical goals for the shift include marimar rene      Problem: Pain - Adult  Goal: Verbalizes/displays adequate comfort level or baseline comfort level  Outcome: Progressing     Problem: Safety - Adult  Goal: Free from fall injury  Outcome: Progressing     Problem: Discharge Planning  Goal: Discharge to home or other facility with appropriate resources  Outcome: Progressing     Problem: Chronic Conditions and Co-morbidities  Goal: Patient's chronic conditions and co-morbidity symptoms are monitored and maintained or improved  Outcome: Progressing     Problem: Nutrition  Goal: Nutrient intake appropriate for maintaining nutritional needs  Outcome: Progressing

## 2025-06-25 NOTE — ED PROVIDER NOTES
History of Present Illness     History provided by: Patient  Limitations to History: None  External Records Reviewed with Brief Summary: Previous medical history showing bacterial endocarditis as well as a cardiac thrombus    HPI:  Marian Villegas is a 30 y.o. female with pertinent past medical history of bacterial endocarditis and a cardiac mass with a cardiac thrombus who presents with a chief concern of chest pain, shortness of breath and sore throat.  She states that this has been ongoing since the weekend, and is progressively worsened.  She went to an outside facility with her mainly sore throat was told she had some lymph nodes and sent home with the suspected viral illness.  Since then she has had worsening pain spreading down into her chest and she feels short of breath.  The shortness of breath appears to be all the time and not worse with lying flat.  She denies productive cough, fevers, chills.  Denies any abdominal pain nausea vomiting.  Denies any numbness tingling or weakness.    Physical Exam   Triage vitals:  T 36.9 °C (98.4 °F)  HR (!) 133  /77  RR 18  O2 100 % None (Room air)    General: Awake, ill-appearing, uncomfortable  Eyes: Gaze conjugate.  No scleral icterus or injection  HENT: Normo-cephalic, atraumatic. No stridor.  Neck supple no rigidity or meningismus signs, tender anterior lymphadenopathy.  No enlarged thyroid.  No crepitus.  Oropharynx clear, some erythema but no exudates, airway patent.  No swelling underneath the tongue  CV: Tachycardic rate, regular rhythm. Radial pulses 2+ bilaterally  Resp: Breathing non-labored, tachypneic, but speaking in full sentences.  Clear to auscultation bilaterally  GI: Soft, non-distended, non-tender. No rebound or guarding.    MSK/Extremities: No gross bony deformities. Moving all extremities.  No pitting edema.  Skin: Warm. Appropriate color  Neuro: Alert. Oriented. Face symmetric. Speech is fluent.     Psych: Appropriate mood and  affect    Medical Decision Making & ED Course   ED Course:  ED Course as of 06/25/25 2116 Wed Jun 25, 2025   1049 Patient is a 30-year-old female with pertinent past medical history of bacterial endocarditis and a cardiac mass with a cardiac thrombus who presents with a chief concern of chest pain, shortness of breath and sore throat.  She states that this has been ongoing since the weekend, and is progressively worsened.  She went to an outside facility with her mainly sore throat was told she had some lymph nodes and sent home with the suspected viral illness.  Since then she has had worsening pain spreading down into her chest and she feels short of breath.  The shortness of breath appears to be all the time and not worse with lying flat.  She denies productive cough, fevers, chills.  Denies any abdominal pain nausea vomiting.  Denies any numbness tingling or weakness.    On exam, patient is ill-appearing.  She is tolerating her own secretions, no stridor.  Heart tachycardic, lungs diminished at the bases.  Abdomen soft nontender.  Does have some tender lymphadenopathy in the anterior chain.  No enlarged thyroid.  No crepitus.  Oropharynx clear, some erythema but no exudates, airway patent.  No swelling underneath the tongue.    Does have pulses equal all 4 extremities no pitting edema to bilateral lower extremities.    Differential diagnose includes but not limited to PE, underlying infection, hematogenous seeding bacterial endocarditis, with such a broad infectious differential could also consider atypical ACS.  Will complete viral swabs, urine studies and sepsis protocol. [KK]   1151 Troponin I, High Sensitivity(!): 35 [KK]   1151 D-Dimer, Quantitative VTE Exclusion(!): 2,313 [KK]   1151 WBC(!): 23.7 [KK]   1151 Lactate(!): 2.2 [KK]   1302 Blood, Urine(!): 0.03 (TRACE) [KK]   1302 Ketones, Urine(!): 10 (1+) [KK]   1302 Bilirubin, Urine(!): 0.5 (1+) [KK]   1302 Urobilinogen, Urine(!): 6 (2+) [KK]   1302  Leukocyte Esterase, Urine(!): 25 Gatito/uL [KK]   1302 WBC, Urine(!): 11-20 [KK]   1508 CT angio chest for pulmonary embolism  IMPRESSION:  1.Pulmonary arteries are suboptimally opacified due to late  contrast bolus, but no large central pulmonary embolism. The  peripheral pulmonary arterials are poorly evaluated.  2.Patchy consolidation in the right middle lobe and right lower  lobe compatible with pneumonia.  3.Small right pleural effusion.  Signed by Rowdy Fleming MD   [KK]      ED Course User Index  [KK] Christophe aMrin DO         Diagnoses as of 25   Pneumonia of right middle lobe due to infectious organism   Acute pharyngitis, unspecified etiology   Chest pain, unspecified type   Elevated troponin       Medical Decision Makin y.o. female patient presented with chest pain and shortness of breath after initially starting with a sore throat.  On exam, patient is ill-appearing, but no red flag symptoms concerning for intraoral infection, swelling.  Workup and evaluation revealing a pneumonia with an elevated troponin.  No PE.  Patient will require admission for greater than 2 midnights with her past medical history and current active infection.  ----  Discussed the case with the admitting physician who agrees with current management and accepted the patient for admission.  Patient stable at the time of admission.    Differential diagnoses considered include but are not limited to: see ED Course    EKG Independent Interpretation: Sinus tachycardia, 131 bpm, , QRS 74, QTc 448.  Normal axis, no ST segment elevation or depression, no abnormal T wave versions, no conduction blocks or delays.     Independent Result Review and Interpretation: see ED course     Chronic conditions affecting the patient's care: as documented in ED course/MDM    The patient was discussed with the following consultants/services: see ED course    Care Considerations: as documented in ED course/MDM      Disposition   As a  result of their workup, the patient will require admission to the hospital.  The patient was informed of her diagnosis.  The patient was given the opportunity to ask questions and I answered them. The patient agreed to be admitted to the hospital.    Procedures   Procedures       Christophe Marin,   06/25/25 2114

## 2025-06-25 NOTE — H&P
History Of Present Illness  30 YOF PMH lupus anticoagulant, cardiac thrombus, endocarditis, not on blood thinners currently, presenting with sore throat and shortness of breath.    Symptoms began 4 days ago with sore throat and sinus congestion. Following day developed non-productive cough, and SOB. Denies fever, chills, night sweats, chest pain, N/V, diaphoresis, abdominal pain or swelling of lower extremities.    She was afebrile, tachy 130s, tachypnea high 20s.  CBC notable for leukocytosis of 23.7 with bandemia.  CMP showed hypokalemia of 3.1, mild metabolic acidosis with bicarb of 20, normal AG. D-dimer 2300.  CTA chest negative for saddle embolus, but due to dye timing inconclusive of smaller emboli. Did show pneumonia.  COVID negative.  Initial troponin 35, delta troponin 4.  EKG showed sinus tachycardia with old T wave inversions in V3-V4 when compared to EKG march 2023. BCX pending, giving rocephin and doxy and admitted for sepsis.     Past Medical History  She has no past medical history on file.    Surgical History  She has no past surgical history on file.     Social History  She reports that she has been smoking cigarettes. She does not have any smokeless tobacco history on file. No history on file for alcohol use and drug use.    Family History  Family History[1]     Allergies  Coconut    Review of Systems    12 pt ROS obtained: positives & pertinent negatives listed in HPI   Physical Exam   Constitutional: A&Ox4, NAD, resting comfortable   Head and Face: Atraumatic, normocephalic   Eyes: Normal external exam, EOMI  ENT: Normal external inspection of ears and nose. Oropharynx normal.  Cardiovascular: RRR, S1/S2, no murmurs, rubs, or gallops, radial pulses +2  Pulmonary: CTAB, no respiratory distress, no wheezing, rales or rhonchi, on RA  Abdomen: +BS, soft, non-tender, nondistended, no guarding rigidity or rebound tenderness, no masses noted  MSK: Negative for edema, No joint swelling, normal movements  of all extremities.   Neuro: No focal deficits, normal motor function, normal sensation, follows all commands  Skin- No lesions, contusions, or erythema. Normal capillary refill   Psychiatric: Judgment intact. Appropriate mood, affect and behavior   Last Recorded Vitals  /72 (BP Location: Right arm, Patient Position: Sitting)   Pulse (!) 113   Temp 37.4 °C (99.4 °F) (Oral)   Resp 22   Wt 145 kg (319 lb)   SpO2 99%     Relevant Results  Results for orders placed or performed during the hospital encounter of 06/25/25 (from the past 24 hours)   CBC and Auto Differential   Result Value Ref Range    WBC 23.7 (H) 4.4 - 11.3 x10*3/uL    nRBC 0.0 0.0 - 0.0 /100 WBCs    RBC 4.79 4.00 - 5.20 x10*6/uL    Hemoglobin 14.0 12.0 - 16.0 g/dL    Hematocrit 42.6 36.0 - 46.0 %    MCV 89 80 - 100 fL    MCH 29.2 26.0 - 34.0 pg    MCHC 32.9 32.0 - 36.0 g/dL    RDW 14.3 11.5 - 14.5 %    Platelets 288 150 - 450 x10*3/uL    Immature Granulocytes %, Automated 1.4 (H) 0.0 - 0.9 %    Immature Granulocytes Absolute, Automated 0.33 0.00 - 0.70 x10*3/uL   Magnesium   Result Value Ref Range    Magnesium 1.93 1.60 - 2.40 mg/dL   Comprehensive metabolic panel   Result Value Ref Range    Glucose 142 (H) 74 - 99 mg/dL    Sodium 133 (L) 136 - 145 mmol/L    Potassium 3.1 (L) 3.5 - 5.3 mmol/L    Chloride 100 98 - 107 mmol/L    Bicarbonate 20 (L) 21 - 32 mmol/L    Anion Gap 16 10 - 20 mmol/L    Urea Nitrogen 8 6 - 23 mg/dL    Creatinine 0.66 0.50 - 1.05 mg/dL    eGFR >90 >60 mL/min/1.73m*2    Calcium 9.3 8.6 - 10.3 mg/dL    Albumin 3.5 3.4 - 5.0 g/dL    Alkaline Phosphatase 97 33 - 110 U/L    Total Protein 7.3 6.4 - 8.2 g/dL    AST 11 9 - 39 U/L    Bilirubin, Total 0.8 0.0 - 1.2 mg/dL    ALT 20 7 - 45 U/L   Lactate   Result Value Ref Range    Lactate 2.2 (H) 0.4 - 2.0 mmol/L   Troponin I, High Sensitivity   Result Value Ref Range    Troponin I, High Sensitivity 35 (H) 0 - 13 ng/L   B-Type Natriuretic Peptide   Result Value Ref Range    BNP 60 0  - 99 pg/mL   D-Dimer, VTE Exclusion   Result Value Ref Range    D-Dimer, Quantitative VTE Exclusion 2,313 (H) <=500 ng/mL FEU   Manual Differential   Result Value Ref Range    Neutrophils %, Manual 94.0 40.0 - 80.0 %    Lymphocytes %, Manual 6.0 13.0 - 44.0 %    Monocytes %, Manual 0.0 2.0 - 10.0 %    Eosinophils %, Manual 0.0 0.0 - 6.0 %    Basophils %, Manual 0.0 0.0 - 2.0 %    Seg Neutrophils Absolute, Manual 22.28 (H) 1.20 - 7.00 x10*3/uL    Lymphocytes Absolute, Manual 1.42 1.20 - 4.80 x10*3/uL    Monocytes Absolute, Manual 0.00 (L) 0.10 - 1.00 x10*3/uL    Eosinophils Absolute, Manual 0.00 0.00 - 0.70 x10*3/uL    Basophils Absolute, Manual 0.00 0.00 - 0.10 x10*3/uL    Total Cells Counted 100     RBC Morphology No significant RBC morphology present    Human Chorionic Gonadotropin, Serum Quantitative   Result Value Ref Range    HCG, Beta-Quantitative 2 <5 mIU/mL   ECG 12 lead   Result Value Ref Range    Ventricular Rate 131 BPM    Atrial Rate 131 BPM    WV Interval 136 ms    QRS Duration 74 ms    QT Interval 304 ms    QTC Calculation(Bazett) 448 ms    P Axis 59 degrees    R Axis 33 degrees    T Axis -16 degrees    QRS Count 22 beats    Q Onset 218 ms    P Onset 150 ms    P Offset 204 ms    T Offset 370 ms    QTC Fredericia 394 ms   Blood Gas Venous Full Panel   Result Value Ref Range    POCT pH, Venous 7.48 (H) 7.33 - 7.43 pH    POCT pCO2, Venous 33 (L) 41 - 51 mm Hg    POCT pO2, Venous 69 (H) 35 - 45 mm Hg    POCT SO2, Venous 94 (H) 45 - 75 %    POCT Oxy Hemoglobin, Venous 91.2 (H) 45.0 - 75.0 %    POCT Hematocrit Calculated, Venous 43.0 36.0 - 46.0 %    POCT Sodium, Venous 131 (L) 136 - 145 mmol/L    POCT Potassium, Venous 3.0 (L) 3.5 - 5.3 mmol/L    POCT Chloride, Venous 100 98 - 107 mmol/L    POCT Ionized Calicum, Venous 1.25 1.10 - 1.33 mmol/L    POCT Glucose, Venous 132 (H) 74 - 99 mg/dL    POCT Lactate, Venous 1.2 0.4 - 2.0 mmol/L    POCT Base Excess, Venous 1.6 -2.0 - 3.0 mmol/L    POCT HCO3 Calculated,  Venous 24.6 22.0 - 26.0 mmol/L    POCT Hemoglobin, Venous 14.3 12.0 - 16.0 g/dL    POCT Anion Gap, Venous 9.0 (L) 10.0 - 25.0 mmol/L    Patient Temperature 37.0 degrees Celsius    FiO2 21 %   Blood Culture    Specimen: Peripheral Venipuncture; Blood culture   Result Value Ref Range    Blood Culture Loaded on Instrument - Culture in progress    Blood Culture    Specimen: Peripheral Venipuncture; Blood culture   Result Value Ref Range    Blood Culture Loaded on Instrument - Culture in progress    Sars-CoV-2 and Influenza A/B PCR   Result Value Ref Range    Flu A Result Not Detected Not Detected    Flu B Result Not Detected Not Detected    Coronavirus 2019, PCR Not Detected Not Detected   hCG, Urine, Qualitative   Result Value Ref Range    HCG, Urine NEGATIVE NEGATIVE   Urinalysis with Reflex Culture and Microscopic   Result Value Ref Range    Color, Urine Yellow Light-Yellow, Yellow, Dark-Yellow    Appearance, Urine Turbid (N) Clear    Specific Gravity, Urine 1.035 1.005 - 1.035    pH, Urine 6.5 5.0, 5.5, 6.0, 6.5, 7.0, 7.5, 8.0    Protein, Urine 200 (2+) (A) NEGATIVE, 10 (TRACE), 20 (TRACE) mg/dL    Glucose, Urine 70 (1+) (A) Normal mg/dL    Blood, Urine 0.03 (TRACE) (A) NEGATIVE mg/dL    Ketones, Urine 10 (1+) (A) NEGATIVE mg/dL    Bilirubin, Urine 0.5 (1+) (A) NEGATIVE mg/dL    Urobilinogen, Urine 6 (2+) (A) Normal mg/dL    Nitrite, Urine NEGATIVE NEGATIVE    Leukocyte Esterase, Urine 25 Gatito/uL (A) NEGATIVE   Microscopic Only, Urine   Result Value Ref Range    WBC, Urine 11-20 (A) 1-5, NONE /HPF    RBC, Urine 3-5 NONE, 1-2, 3-5 /HPF    Squamous Epithelial Cells, Urine 10-25 (FEW) Reference range not established. /HPF    Mucus, Urine 2+ Reference range not established. /LPF   Lactate   Result Value Ref Range    Lactate 1.3 0.4 - 2.0 mmol/L   Troponin I, High Sensitivity, Initial   Result Value Ref Range    Troponin I, High Sensitivity 4 0 - 13 ng/L      Assessment/Plan   Assessment & Plan  Pneumonia of right middle  lobe due to infectious organism  Sepsis   Hypokalemia  Hx of cardiac thrombus  Hx of positive lupus anticoagulant   Hx of endocarditis     30 YOF PMH lupus anticoagulant, cardiac thrombus not on AC currently, endocarditis, n presenting with sore throat and shortness of breath. afebrile, tachy 130s, tachypnea high 20s.  CBC notable for leukocytosis of 23.7 with bandemia. Lactate 2.2 -> 1.0. CTA chest negative for saddle embolus, but due to dye timing inconclusive of smaller emboli. Did show pneumonia.  COVID negative.  Initial troponin 35, delta troponin 4.      - Due to inconclusive CTA chest, V/Q scan ordered and negative for perfusion defects.  - Duplex US BLE pending    - Not currently on AC, unsure if she took her self off AC vs provider did, has not seen a provider in over a year  - Start DVT ppx  - BCX 06/25: pending   - Start unasyn   - Legionella, strep pneumo urine AG, procalcitonin pending  - Maintain SpO2 > 92%  - Pulmonary toillet  - Duonebs q6 prn    - Will obtain echocardiogram due to extensive history and tropinemia     IVF: None  DVT Ppx: Lovenox  Diet: regular  Code Status: FULL CODE    Admitted for sepsis with acute organ damage, source pneumonia with extensive hx of cardiac thrombus and endocarditis. Complexity high, anticipate > 2 [place] stays           Angel Sanches DO         [1] No family history on file.

## 2025-06-25 NOTE — ED TRIAGE NOTES
PT states having sore throat and swollen lymph nodes since Saturday, states getting worse and hurts in chest when breathing. Denies coughing/fever/n/v.

## 2025-06-25 NOTE — ASSESSMENT & PLAN NOTE
Sepsis   Hypokalemia  Hx of cardiac thrombus  Hx of positive lupus anticoagulant   Hx of endocarditis

## 2025-06-26 ENCOUNTER — APPOINTMENT (OUTPATIENT)
Dept: CARDIOLOGY | Facility: HOSPITAL | Age: 30
End: 2025-06-26
Payer: MEDICAID

## 2025-06-26 LAB
ANION GAP SERPL CALC-SCNC: 15 MMOL/L (ref 10–20)
BUN SERPL-MCNC: 6 MG/DL (ref 6–23)
CALCIUM SERPL-MCNC: 9.1 MG/DL (ref 8.6–10.3)
CHLORIDE SERPL-SCNC: 103 MMOL/L (ref 98–107)
CO2 SERPL-SCNC: 21 MMOL/L (ref 21–32)
CREAT SERPL-MCNC: 0.62 MG/DL (ref 0.5–1.05)
EGFRCR SERPLBLD CKD-EPI 2021: >90 ML/MIN/1.73M*2
ERYTHROCYTE [DISTWIDTH] IN BLOOD BY AUTOMATED COUNT: 13.9 % (ref 11.5–14.5)
GLUCOSE SERPL-MCNC: 92 MG/DL (ref 74–99)
HCT VFR BLD AUTO: 37.4 % (ref 36–46)
HGB BLD-MCNC: 12.8 G/DL (ref 12–16)
LEGIONELLA AG UR QL: NEGATIVE
MAGNESIUM SERPL-MCNC: 2.23 MG/DL (ref 1.6–2.4)
MCH RBC QN AUTO: 29.3 PG (ref 26–34)
MCHC RBC AUTO-ENTMCNC: 34.2 G/DL (ref 32–36)
MCV RBC AUTO: 86 FL (ref 80–100)
NRBC BLD-RTO: 0 /100 WBCS (ref 0–0)
PLATELET # BLD AUTO: 285 X10*3/UL (ref 150–450)
POTASSIUM SERPL-SCNC: 3.2 MMOL/L (ref 3.5–5.3)
PROCALCITONIN SERPL-MCNC: 0.94 NG/ML
RBC # BLD AUTO: 4.37 X10*6/UL (ref 4–5.2)
S PNEUM AG UR QL: NEGATIVE
SODIUM SERPL-SCNC: 136 MMOL/L (ref 136–145)
WAYSON STAIN: ABNORMAL
WBC # BLD AUTO: 23.2 X10*3/UL (ref 4.4–11.3)

## 2025-06-26 PROCEDURE — 93005 ELECTROCARDIOGRAM TRACING: CPT

## 2025-06-26 PROCEDURE — 94640 AIRWAY INHALATION TREATMENT: CPT

## 2025-06-26 PROCEDURE — 2500000002 HC RX 250 W HCPCS SELF ADMINISTERED DRUGS (ALT 637 FOR MEDICARE OP, ALT 636 FOR OP/ED): Performed by: INTERNAL MEDICINE

## 2025-06-26 PROCEDURE — 97165 OT EVAL LOW COMPLEX 30 MIN: CPT | Mod: GO

## 2025-06-26 PROCEDURE — 99232 SBSQ HOSP IP/OBS MODERATE 35: CPT | Performed by: INTERNAL MEDICINE

## 2025-06-26 PROCEDURE — 83735 ASSAY OF MAGNESIUM: CPT

## 2025-06-26 PROCEDURE — 2500000004 HC RX 250 GENERAL PHARMACY W/ HCPCS (ALT 636 FOR OP/ED)

## 2025-06-26 PROCEDURE — 85027 COMPLETE CBC AUTOMATED: CPT

## 2025-06-26 PROCEDURE — 80048 BASIC METABOLIC PNL TOTAL CA: CPT

## 2025-06-26 PROCEDURE — 1200000002 HC GENERAL ROOM WITH TELEMETRY DAILY

## 2025-06-26 PROCEDURE — 97161 PT EVAL LOW COMPLEX 20 MIN: CPT | Mod: GP

## 2025-06-26 PROCEDURE — 36415 COLL VENOUS BLD VENIPUNCTURE: CPT

## 2025-06-26 PROCEDURE — 2500000001 HC RX 250 WO HCPCS SELF ADMINISTERED DRUGS (ALT 637 FOR MEDICARE OP)

## 2025-06-26 RX ORDER — BENZONATATE 100 MG/1
100 CAPSULE ORAL 3 TIMES DAILY PRN
Status: DISCONTINUED | OUTPATIENT
Start: 2025-06-26 | End: 2025-07-01 | Stop reason: HOSPADM

## 2025-06-26 RX ORDER — ALBUTEROL SULFATE 0.83 MG/ML
2.5 SOLUTION RESPIRATORY (INHALATION) EVERY 2 HOUR PRN
Status: DISCONTINUED | OUTPATIENT
Start: 2025-06-26 | End: 2025-07-01 | Stop reason: HOSPADM

## 2025-06-26 RX ORDER — ALBUTEROL SULFATE 0.83 MG/ML
2.5 SOLUTION RESPIRATORY (INHALATION) EVERY 6 HOURS PRN
Status: DISCONTINUED | OUTPATIENT
Start: 2025-06-26 | End: 2025-06-26

## 2025-06-26 RX ADMIN — GUAIFENESIN SYRUP AND DEXTROMETHORPHAN 5 ML: 100; 10 SYRUP ORAL at 03:33

## 2025-06-26 RX ADMIN — ENOXAPARIN SODIUM 40 MG: 40 INJECTION SUBCUTANEOUS at 08:21

## 2025-06-26 RX ADMIN — AMPICILLIN SODIUM AND SULBACTAM SODIUM 3 G: 2; 1 INJECTION, POWDER, FOR SOLUTION INTRAMUSCULAR; INTRAVENOUS at 00:17

## 2025-06-26 RX ADMIN — AMPICILLIN SODIUM AND SULBACTAM SODIUM 3 G: 2; 1 INJECTION, POWDER, FOR SOLUTION INTRAMUSCULAR; INTRAVENOUS at 06:50

## 2025-06-26 RX ADMIN — AMPICILLIN SODIUM AND SULBACTAM SODIUM 3 G: 2; 1 INJECTION, POWDER, FOR SOLUTION INTRAMUSCULAR; INTRAVENOUS at 18:26

## 2025-06-26 RX ADMIN — GUAIFENESIN SYRUP AND DEXTROMETHORPHAN 5 ML: 100; 10 SYRUP ORAL at 22:15

## 2025-06-26 RX ADMIN — ALBUTEROL SULFATE 2.5 MG: 2.5 SOLUTION RESPIRATORY (INHALATION) at 17:11

## 2025-06-26 RX ADMIN — ACETAMINOPHEN 975 MG: 325 TABLET ORAL at 20:17

## 2025-06-26 RX ADMIN — ENOXAPARIN SODIUM 40 MG: 40 INJECTION SUBCUTANEOUS at 20:18

## 2025-06-26 RX ADMIN — GUAIFENESIN SYRUP AND DEXTROMETHORPHAN 5 ML: 100; 10 SYRUP ORAL at 08:21

## 2025-06-26 RX ADMIN — AMPICILLIN SODIUM AND SULBACTAM SODIUM 3 G: 2; 1 INJECTION, POWDER, FOR SOLUTION INTRAMUSCULAR; INTRAVENOUS at 12:41

## 2025-06-26 RX ADMIN — GUAIFENESIN SYRUP AND DEXTROMETHORPHAN 5 ML: 100; 10 SYRUP ORAL at 15:04

## 2025-06-26 ASSESSMENT — COGNITIVE AND FUNCTIONAL STATUS - GENERAL
MOBILITY SCORE: 24
MOBILITY SCORE: 24
DAILY ACTIVITIY SCORE: 24
DAILY ACTIVITIY SCORE: 24

## 2025-06-26 ASSESSMENT — PAIN - FUNCTIONAL ASSESSMENT
PAIN_FUNCTIONAL_ASSESSMENT: 0-10

## 2025-06-26 ASSESSMENT — PAIN SCALES - GENERAL
PAINLEVEL_OUTOF10: 6
PAINLEVEL_OUTOF10: 0 - NO PAIN
PAINLEVEL_OUTOF10: 0 - NO PAIN
PAINLEVEL_OUTOF10: 5 - MODERATE PAIN

## 2025-06-26 ASSESSMENT — ACTIVITIES OF DAILY LIVING (ADL)
ADL_ASSISTANCE: INDEPENDENT
ADL_ASSISTANCE: INDEPENDENT
BATHING_ASSISTANCE: INDEPENDENT
LACK_OF_TRANSPORTATION: NO

## 2025-06-26 ASSESSMENT — PAIN DESCRIPTION - ORIENTATION: ORIENTATION: RIGHT;ANTERIOR

## 2025-06-26 ASSESSMENT — PAIN DESCRIPTION - LOCATION: LOCATION: CHEST

## 2025-06-26 NOTE — PROGRESS NOTES
Occupational Therapy    Evaluation    Patient Name: Marian Villegas  MRN: [id number]  Department: [facility]  Room: [facility]/528-A  Today's Date: 6/26/2025  Time Calculation  Start Time: 0921  Stop Time: 0930  Time Calculation (min): 9 min        Assessment:  OT Assessment: Pt presents independent with ADLs and functional transfers. No acute OT needs at this time.  Prognosis: Good  Barriers to Discharge Home: No anticipated barriers  Evaluation/Treatment Tolerance: Patient tolerated treatment well  Medical Staff Made Aware: Yes  End of Session Communication: Bedside nurse  End of Session Patient Position: Bed, 3 rail up, Alarm off, not on at start of session  Prognosis: Good  Evaluation/Treatment Tolerance: Patient tolerated treatment well  Medical Staff Made Aware: Yes  Strengths: Ability to acquire knowledge, Premorbid level of function, Support and attitude of living partners  Barriers to Participation: Comorbidities  Plan:  No Skilled OT: At baseline function  Equipment Recommended upon Discharge:  (no needs)  OT - OK to Discharge: Yes (Eval and DC)       Subjective   Current Problem:  1. Pneumonia of right middle lobe due to infectious organism        2. Acute pharyngitis, unspecified etiology        3. Chest pain, unspecified type        4. Elevated troponin  Vascular US lower extremity venous duplex bilateral    Vascular US lower extremity venous duplex bilateral      5. Bacterial endocarditis, unspecified chronicity (HHS-HCC)  Vascular US lower extremity venous duplex bilateral    Vascular US lower extremity venous duplex bilateral      6. Lupus anticoagulant positive  Vascular US lower extremity venous duplex bilateral    Vascular US lower extremity venous duplex bilateral    Transthoracic Echo Complete    Transthoracic Echo Complete      7. Intracardiac thrombus  Transthoracic Echo Complete    Transthoracic Echo Complete      8. Shortness of breath  Transthoracic Echo Complete    Transthoracic Echo Complete        OT  Visit Info:  OT Received On: 06/26/25  General:  General  Reason for Referral: Pt is a 31 yo female presenting due to SOB and sore throat. Pt found to have PNA. Pt with relevant PMHx of Lupus  Referred By: Angel Sanches DO  Past Medical History Relevant to Rehab: Medical History[1]  Family/Caregiver Present: No  Co-Treatment: PT  Co-Treatment Reason: for maximal pt safety and participation  Prior to Session Communication: Bedside nurse  Patient Position Received: Bed, 3 rail up, Alarm off, not on at start of session  General Comment: Pt pleasant and agreeable to PT/OT evaluations. RN reporting pt independent however pt stating concern with mobility due to fatigue  Precautions:  Medical Precautions: Fall precautions  Pain:  Pain Assessment  0-10 (Numeric) Pain Score: 6  Pain Location: Throat    Objective   Cognition:  Orientation Level: Oriented X4  Attention: Within Functional Limits  Memory: Within Funtional Limits  Insight: Within function limits  Impulsive: Within functional limits     Home Living:  Type of Home: House  Lives With:  (mother and aunt)  Home Adaptive Equipment: None  Home Layout: Two level, Stairs to alternate level with rails, Bed/bath upstairs  Alternate Level Stairs-Rails:  (unilateral)  Alternate Level Stairs-Number of Steps: full flight  Home Access: Stairs to enter with rails  Entrance Stairs-Rails:  (unilateral)  Entrance Stairs-Number of Steps: 4  Bathroom Shower/Tub: Tub/shower unit  Bathroom Toilet: Standard  Bathroom Equipment: None  Prior Function:  Level of [place]: Independent with ADLs and functional transfers, Independent with homemaking with ambulation  Receives Help From: Family  ADL Assistance: Independent  Homemaking Assistance: Independent  Ambulatory Assistance: Independent  Vocational: Part time employment ([employer])  Prior Function Comments: (-) driving (reports family or [employer] drives her when needed)  ADL:  Eating Assistance: Independent  Grooming  Assistance: Independent  Bathing Assistance: Independent  UE Dressing Assistance: Independent  LE Dressing Assistance: Independent  Toileting Assistance with Device: Independent  Functional Assistance: Independent  Activity Tolerance:  Endurance: Endurance does not limit participation in activity  Bed Mobility/Transfers: Bed Mobility 1  Bed Mobility 1: Supine to sitting  Level of Assistance 1: Independent    Transfer 1  Transfer From 1: Sit to  Transfer to 1: Stand  Technique 1: Sit to stand, Stand to sit  Transfer Level of Assistance 1: Independent  Trials/Comments 1: no device      Functional Mobility:     Sitting Balance:  Static Sitting Balance  Static Sitting-Balance Support: Feet supported  Static Sitting-Level of Assistance: Independent  Standing Balance:  Static Standing Balance  Static Standing-Balance Support: No upper extremity supported  Static Standing-Level of Assistance: Independent   Vision:Vision - Basic Assessment  Current Vision: Wears glasses all the time  Sensation:  Light Touch: No apparent deficits  Sharp/Dull: No apparent deficits  Strength:  Strength Comments: BUEs WFL  Coordination:  Movements are Fluid and Coordinated: Yes   Hand Function:  Gross Grasp: Functional  Coordination: Functional  Extremities: RUE   RUE : Within Functional Limits and LUE   LUE: Within Functional Limits    Outcome Measures:[facility] Daily Activity  Putting on and taking off regular lower body clothing: None  Bathing (including washing, rinsing, drying): None  Putting on and taking off regular upper body clothing: None  Toileting, which includes using toilet, bedpan or urinal: None  Taking care of personal grooming such as brushing teeth: None  Eating Meals: None  Daily Activity - Total Score: 24    Education Documentation  Body Mechanics, taught by Onelia Crawford OT at 6/26/2025 10:23 AM.  Learner: Patient  Readiness: Acceptance  Method: Explanation  Response: Verbalizes Understanding    Precautions, taught by Onelia  OSMANI Crawford at 6/26/2025 10:23 AM.  Learner: Patient  Readiness: Acceptance  Method: Explanation  Response: Verbalizes Understanding    ADL Training, taught by Onelia Crawford OT at 6/26/2025 10:23 AM.  Learner: Patient  Readiness: Acceptance  Method: Explanation  Response: Verbalizes Understanding    Education Comments  No comments found.           [1] History reviewed. No pertinent past medical history.

## 2025-06-26 NOTE — PROGRESS NOTES
Marian Villegas is a 30 y.o. female on day 1 of admission presenting with Pneumonia of right middle lobe due to infectious organism.      Subjective   Patient was seen and examined at bedside this morning, stated that she is feeling much better, admits of dry cough, and denies fever, chills, nausea, vomiting, chest pain or shortness of breath at that time.       Objective     Last Recorded Vitals  /78 (BP Location: Right arm, Patient Position: Sitting)   Pulse 105   Temp 37.3 °C (99.1 °F) (Temporal)   Resp 18   Wt 145 kg (319 lb)   SpO2 94%   Intake/Output last 3 Shifts:    Intake/Output Summary (Last 24 hours) at 6/26/2025 1643  Last data filed at 6/26/2025 1341  Gross per 24 hour   Intake 640 ml   Output --   Net 640 ml       Admission Weight  Weight: 145 kg (319 lb) (06/25/25 0947)    Daily Weight  06/25/25 : 145 kg (319 lb)    Image Results  Vascular US lower extremity venous duplex bilateral  Narrative: Interpreted By:  Jassi Norman,   STUDY:  [facility] US LOWER EXTREMITY VENOUS DUPLEX BILATERAL;  6/25/2025 6:32 pm      INDICATION:  Signs/Symptoms:Elevated D dimer, history of thrombus.      COMPARISON:  None.      ACCESSION NUMBER(S):  [id number]      ORDERING CLINICIAN:  CHINTAN ERAZO      TECHNIQUE:  Grayscale, color and spectral Doppler sonographic images of the  bilateral lower extremity deep venous system.      FINDINGS:  Incidental note is made of rouleaux flow bilaterally.      RIGHT: There is normal compressibility of the common femoral vein,  saphenous femoral junction, profunda femoral vein and popliteal vein.  The posterior tibial and peroneal veins  demonstrate normal color  flow and compressibility. There is normal spontaneous and phasic  variation throughout the leg by spectral doppler.      LEFT: There is normal compressibility of the common femoral vein,  saphenous femoral junction, profunda femoral vein and popliteal vein.  The posterior tibial and peroneal veins  demonstrate normal  color  flow and compressibility.  There is normal spontaneous and phasic  variation throughout the leg by spectral doppler.      OTHER FINDINGS: None.      Impression: No sonographic evidence DVT in the visualized vessels of bilateral  lower extremities.      MACRO:  None      Signed by: Jassi Norman 6/25/2025 6:59 PM  Dictation workstation:   [id number]  NM Lung perfusion with spect/ct  Narrative: Interpreted By:  Kailey Buckley,   STUDY:  NM LUNG PERFUSION WITH SPECT/CT;  6/25/2025 4:47 pm      INDICATION:  Signs/Symptoms:r/o PE.      COMPARISON:  Chest x-ray from      ACCESSION NUMBER(S):  [id number]      ORDERING CLINICIAN:  LUCINDA IBANEZ      TECHNIQUE:  DIVISION OF NUCLEAR MEDICINE  PERFUSION LUNG SCANS      Multiple perfusion images of the lungs were acquired after the  intravenous administration of 4.2 mCi of Tc-99m macroaggregated  albumin (MAA). In addition, SPECT SPECT/CT of the chest was performed.      FINDINGS:  Planar perfusion images of both lungs demonstrate mild heterogeneity  throughout the lung fields bilaterally. There are no distinct  segmental perfusion defects seen on SPECT SPECT/CT.          Impression: The perfusion of both lungs is within normal limits with no evidence  for acute pulmonary embolism.              The interpretation above is based on modified PIOPED II and PISAPED  criteria.      This study was analyzed and interpreted at [place], [place].          Signed by: Kailey Buckley 6/25/2025 4:56 PM  Dictation workstation:   [id number]  ECG 12 lead  Sinus tachycardia  Biatrial enlargement  T wave abnormality, consider inferior ischemia  T wave abnormality, consider anterolateral ischemia  Abnormal ECG  When compared with ECG of 26-JUN-2023 14:17,  Vent. rate has increased BY  47 BPM  T wave inversion now evident in Lateral leads  CT angio chest for pulmonary embolism  Narrative: STUDY:  CT Angiogram of the Chest; 06/25/2025 01:38PM  INDICATION:  Right  risk for pulmonary embolism.  COMPARISON:  CTA Chest 07/27/2023, 03/01/2023; NM Perfusion 03/02/2023.  ACCESSION NUMBER(S):  [id number]  ORDERING CLINICIAN:  LUCINDA IBANEZ  TECHNIQUE:  CTA of the chest was performed with intravenous contrast.   Images are reviewed and processed at a workstation according to the CT  angiogram protocol with 3-D and/or MIP post processing imaging  generated.  Omnipaque 350-75 mL was administered intravenously.   Automated mA/kV exposure control was utilized and patient examination  was performed in strict accordance with principles of ALARA.  FINDINGS:  Pulmonary arteries are suboptimally opacified due to late contrast  bolus, but no large central pulmonary embolism.  The peripheral  pulmonary arterials are poorly evaluated.  The thoracic aorta is  normal in course and caliber without dissection or aneurysm.  The heart is normal in size without pericardial effusion.  Thoracic  lymph nodes are not enlarged.  Small right pleural effusion.  The airways are patent.  Patchy consolidation in the right middle lobe and right lower lobe.   Left lung is clear.  Upper abdomen demonstrates no acute pathology.  There are no acute fractures.  No suspicious bony lesions.  Impression: 1.Pulmonary arteries are suboptimally opacified due to late  contrast bolus, but no large central pulmonary embolism. The  peripheral pulmonary arterials are poorly evaluated.  2.Patchy consolidation in the right middle lobe and right lower  lobe compatible with pneumonia.  3.Small right pleural effusion.  Signed by Rowdy Fleming MD      Physical Exam  Constitutional: Well-developed, alert active, cooperative not in acute distress  Eyes: PERRLA, clear sclera  ENMT: Moist mucosal membranes, no exudate  Head / Neck: Atraumatic, normocephalic, supple neck, JVP not visualized  Lungs: Patent airways, CTABL  Heart: RRR, S1S2, no murmurs appreciated, palpable pulses in all extremities  GI: Soft, NT, ND, bowel sounds present  in all quadrants  MSK: Moves all extremities freely, no restriction  of ROM, no joint edema  Extremities: Intact x 4, no peripheral edema  : No Seals catheter inserted  Breast: Deferred  Neurological: AAO x 3 to person, place and date, facial muscles symmetrical, sensation intact, strength 4/4, no acute focal neurological deficits appreciated  Psychological: Appropriate mood and behavior    Relevant Results             Scheduled medications  Scheduled Medications[1]  Continuous medications  Continuous Medications[2]  PRN medications  PRN Medications[3]         Assessment & Plan  Pneumonia of right middle lobe due to infectious organism  Sepsis   Hypokalemia  Hx of cardiac thrombus  Hx of positive lupus anticoagulant   Hx of endocarditis        30 YOF PMH lupus anticoagulant, cardiac thrombus, endocarditis, not on blood thinners currently, presenting with sore throat and shortness of breath. Symptoms began 4 days ago with sore throat and sinus congestion      Sepsis without endorgan damage  - Left shift leukocytosis: Neutrophilia with bandemia  - CTA negative for pulmonary embolism but shows pneumonia  - Received ceftriaxone and doxycycline in the ED  - Unasyn 3 g IV piggyback every 8 hours  - Blood cultures pending  - Mildly elevated procalcitonin level  - Urine antigen for Legionella and Streptococcus pending  - Incentive spirometer  -Dextromethorphan guaifenesin as needed cough  - Albuterol nebulizer every 4 hours as needed shortness of breath    Generalized fatigue  - PT OT    Hypokalemia  - Replenished in the ED, a.m. labs shows K 3.2  -Replenished with 20 mEq of KCl x 1  - Continue renal function monitoring    History of cardiac thrombus/endocarditis: Not on any anticoagulation  - Echocardiogram    Recent SI, status post hospitalization at [facility]  - Per  note, patient admits to contemplating suicide without plan  - Psychiatry consulted    Diet  - Regular    DVT prophylaxis: Presented  with shortness of breath, weakness, found with pneumonia, need further management, discharge pending clinical improvement    Gabino Villalobos DO           [1] ampicillin-sulbactam, 3 g, intravenous, q6h  enoxaparin, 40 mg, subcutaneous, q12h ASHLY  perflutren lipid microspheres, 0.5-10 mL of dilution, intravenous, Once in imaging  perflutren protein A microsphere, 0.5 mL, intravenous, Once in imaging  polyethylene glycol, 17 g, oral, Daily  [2]    [3] PRN medications: acetaminophen, albuterol, benzonatate, dextromethorphan-guaifenesin, ondansetron, phenoL

## 2025-06-26 NOTE — PROGRESS NOTES
Physical Therapy    Physical Therapy Evaluation Only    Patient Name: Marian Villegas  MRN: [id number]  Department: [facility]  Room: [facility]528-A  Today's Date: 6/26/2025   Time Calculation  Start Time: 0920  Stop Time: 0930  Time Calculation (min): 10 min    Assessment/Plan   PT Assessment  Rehab Prognosis: Good  Barriers to Discharge Home: No anticipated barriers  Evaluation/Treatment Tolerance: Patient tolerated treatment well  Medical Staff Made Aware: Yes  Strengths: Ability to acquire knowledge, Insight into problems, Physical health, Premorbid level of function  Barriers to Participation:  (none)  End of Session Communication: Bedside nurse  Assessment Comment: Pt demonstrates independence with all mobility including gait. Pt declining concerns with stairs. Pt demonstrates no need for acute PT services as well as at discharge. Evaluation only completed. Orders discharged  End of Session Patient Position: Bed, 3 rail up, Alarm off, not on at start of session (RN notified - pt independent and up ad rajni in room. Evaluation only and orders discharged for PT)  IP OR SWING BED PT PLAN  Inpatient or Swing Bed: Inpatient  PT Plan  PT Plan: PT Eval only  PT Eval Only Reason: No acute PT needs identified  PT Frequency: PT eval only  PT Discharge Recommendations: No further acute PT, No PT needed after discharge  Equipment Recommended upon Discharge:  (no needs)  PT Recommended Transfer Status: Independent  PT - OK to Discharge: Yes    Subjective     PT Visit Info:  PT Received On: 06/26/25  General Visit Information:  General  Reason for Referral: Pt is a 29 yo female presenting due to SOB and sore throat. Pt found to have PNA. Pt with relevant PMHx of Lupus  Referred By: Angel Sanches DO  Past Medical History Relevant to Rehab: Medical History[1]    Family/Caregiver Present: No  Co-Treatment: OT  Co-Treatment Reason: for maximal pt safety and participation  Prior to Session Communication: Bedside nurse  Patient Position  Received: Bed, 3 rail up, Alarm off, not on at start of session  General Comment: Pt pleasant and agreeable to PT/OT evaluations. RN reporting pt independent however pt stating concern with mobility due to fatigue  Home Living:  Home Living  Type of Home: House  Lives With:  (mother and aunt)  Home Adaptive Equipment: None  Home Layout: Two level, Stairs to alternate level with rails (bed and bathroom on 2nd floor only)  Alternate Level Stairs-Rails:  (unilateral HR)  Alternate Level Stairs-Number of Steps: flight of stairs  Home Access: Stairs to enter with rails  Entrance Stairs-Rails:  (unilateral HR)  Entrance Stairs-Number of Steps: 3-4  Bathroom Shower/Tub: Tub/shower unit  Bathroom Toilet: Standard  Bathroom Equipment: None  Prior Level of Function:  Prior Function Per Pt/Caregiver Report  Level of Joaquin: Independent with ADLs and functional transfers, Independent with homemaking with ambulation  ADL Assistance: Independent  Homemaking Assistance: Independent  Ambulatory Assistance: Independent (no device)  Vocational:  (works as a cook)  Prior Function Comments: (-) driving (reports family or [employer] drives her when needed)  Precautions:  Precautions  Medical Precautions: Fall precautions             Objective   Pain:  Pain Assessment  Pain Assessment: 0-10  0-10 (Numeric) Pain Score: 0 - No pain  Cognition:  Cognition  Overall Cognitive Status: Within Functional Limits  Orientation Level: Oriented X4  Attention: Within Functional Limits  Memory: Within Funtional Limits  Insight: Within function limits  Impulsive: Within functional limits    General Assessments:  Activity Tolerance  Endurance: Endurance does not limit participation in activity    Sensation  Light Touch: No apparent deficits    Coordination  Movements are Fluid and Coordinated: Yes    Static Sitting Balance  Static Sitting-Balance Support: No upper extremity supported, Feet supported  Static Sitting-Level of Assistance:  Independent    Static Standing Balance  Static Standing-Balance Support: No upper extremity supported  Static Standing-Level of Assistance: Independent  Functional Assessments:  Bed Mobility  Bed Mobility: Yes  Bed Mobility 1  Bed Mobility 1: Supine to sitting  Level of Assistance 1: Independent    Transfers  Transfer: Yes  Transfer 1  Transfer From 1: Sit to  Transfer to 1: Stand  Technique 1: Sit to stand, Stand to sit  Transfer Level of Assistance 1: Independent    Ambulation/Gait Training  Ambulation/Gait Training Performed: Yes  Ambulation/Gait Training 1  Surface 1: Level tile  Device 1: No device  Assistance 1: Modified independent  Quality of Gait 1:  (reduced B step length and gait tristen. Occasional stand rest break due to fatigue/SOB)  Comments/Distance (ft) 1: 250ft  Extremity/Trunk Assessments:  RLE   RLE : Within Functional Limits  LLE   LLE : Within Functional Limits  Outcome Measures:  [facility] Basic Mobility  Turning from your back to your side while in a flat bed without using bedrails: None  Moving from lying on your back to sitting on the side of a flat bed without using bedrails: None  Moving to and from bed to chair (including a wheelchair): None  Standing up from a chair using your arms (e.g. wheelchair or bedside chair): None  To walk in hospital room: None  Climbing 3-5 steps with railing: None  Basic Mobility - Total Score: 24        Education Documentation  Precautions, taught by Jewel Moura PT at 6/26/2025 10:07 AM.  Learner: Patient  Readiness: Acceptance  Method: Explanation  Response: Verbalizes Understanding  Comment: see above    Body Mechanics, taught by Jewel Moura PT at 6/26/2025 10:07 AM.  Learner: Patient  Readiness: Acceptance  Method: Explanation  Response: Verbalizes Understanding  Comment: see above    Mobility Training, taught by Jewel Moura PT at 6/26/2025 10:07 AM.  Learner: Patient  Readiness: Acceptance  Method: Explanation  Response:  Verbalizes Understanding  Comment: see above    Education Comments  No comments found.                 [1] History reviewed. No pertinent past medical history.

## 2025-06-26 NOTE — PROGRESS NOTES
Pharmacy Medication History     Source of Information: Per patient     Additional concerns with the patient's PTA list.   N/a  Notified Provider via Haiku : Yes    The following updates were made to the Prior to Admission medication list:     Medications ADDED:   N/a  Medications CHANGED:  N/a  Medications REMOVED:   Lovenox  Heparin  Seasonique  Seasonal  Microgestin  Nonoxynol  Oxycodone  Penicillin  Xarelto  Sodium chloride   Medications NOT TAKING:   Medroxyprogesterone     Allergy reviewed : Yes    Meds 2 Beds : No    Outpatient pharmacy confirmed and updated in chart : Yes    Pharmacy name: [facility]     The list below reflectives the updated PTA list. Please review each medication in order reconciliation for additional clarification and justification.    Prior to Admission Medications   Prescriptions Last Dose   acetaminophen (Tylenol) 500 mg tablet    Sig: Take 2 tablets (1,000 mg) by mouth every 6 hours if needed.   benzocaine-menthoL lozenge    Sig: Take 1 lozenge by mouth every 3 hours if needed.                Facility-Administered Medications: None       The list below reflectives the updated allergy list. Please review each documented allergy for additional clarification and justification.    Allergies   Allergen Reactions    Coconut Unknown          06/26/25 at 10:48 AM - Bev Blankenship

## 2025-06-26 NOTE — PROGRESS NOTES
06/26/25 1144   Discharge Planning   Expected Discharge Disposition Home     Met with patient at bedside. Patient was discharged [place] [facility] [place] 6[place]22 [place] [facility] [place] Patient was seen by psych at [facility] due to thoughts of wanting to die.  Patient was provided with outpatient resources and discharged home. Patient notes that she has these thoughts often, but without plan. Patient reported having episodes of extreme sadness and is sometimes unable to get out of bed. Per patient, she is not taking any psych meds and has not utilized any kind of mental health treatment in the past. Patient states that she does not plan to follow up with [facility] for outpatient psych. SW and patient discussed importance of psychotherapy and ways it may be helpful for patient at this time. Patient works long hours in the [employer] industry and telehealth may be more accessible for her. Patient is agreeable to outpatient psychotherapy.  Psych consult pending, sw will continue to follow.

## 2025-06-26 NOTE — PROGRESS NOTES
06/26/25 1112   Discharge Planning   Living Arrangements Parent  (mom)   Type of Residence Private residence   Number of Stairs to Enter Residence 3   Number of Stairs Within Residence 10   Expected Discharge Disposition Home   Does the patient need discharge transport arranged? No   Financial Resource Strain   How hard is it for you to pay for the very basics like food, housing, medical care, and heating? Not hard   Housing Stability   In the last 12 months, was there a time when you were not able to pay the mortgage or rent on time? N   At any time in the past 12 months, were you homeless or living in a shelter (including now)? N   Transportation Needs   In the past 12 months, has lack of transportation kept you from medical appointments or from getting medications? no   In the past 12 months, has lack of transportation kept you from meetings, work, or from getting things needed for daily living? No   Intensity of Service   Intensity of Service 0-30 min     TCC met with pt at bedside. Explained my role as discharge planner. Pt lives with mom. Was recently dc from [facility]. Pt stated she talked to psych  on the phone yesterday and has another appointment next week. PT OT did eval. Pt has no therapy needs.  Will dc [facility] when med ready. Mom will drive pt home.

## 2025-06-27 ENCOUNTER — APPOINTMENT (OUTPATIENT)
Dept: CARDIOLOGY | Facility: HOSPITAL | Age: 30
End: 2025-06-27
Payer: MEDICAID

## 2025-06-27 LAB
ANION GAP SERPL CALC-SCNC: 11 MMOL/L (ref 10–20)
AORTIC VALVE MEAN GRADIENT: 5 MMHG
AORTIC VALVE PEAK VELOCITY: 1.4 M/S
ATRIAL RATE: 95 BPM
AV PEAK GRADIENT: 8 MMHG
AVA (PEAK VEL): 2.62 CM2
AVA (VTI): 2.68 CM2
BACTERIA UR CULT: NORMAL
BUN SERPL-MCNC: 6 MG/DL (ref 6–23)
CALCIUM SERPL-MCNC: 8.7 MG/DL (ref 8.6–10.3)
CHLORIDE SERPL-SCNC: 101 MMOL/L (ref 98–107)
CO2 SERPL-SCNC: 28 MMOL/L (ref 21–32)
CREAT SERPL-MCNC: 0.56 MG/DL (ref 0.5–1.05)
EGFRCR SERPLBLD CKD-EPI 2021: >90 ML/MIN/1.73M*2
EJECTION FRACTION APICAL 4 CHAMBER: 58.2
EJECTION FRACTION: 55 %
ERYTHROCYTE [DISTWIDTH] IN BLOOD BY AUTOMATED COUNT: 13.9 % (ref 11.5–14.5)
GLOBAL LONGITUDINAL STRAIN: 17 %
GLUCOSE SERPL-MCNC: 104 MG/DL (ref 74–99)
HAEMOPHILUS INFLUENZAE DNA [PRESENCE] BY NAA WITH PROBE DETECTION IN POSITIVE BLOOD CULTURE: DETECTED
HCT VFR BLD AUTO: 36.2 % (ref 36–46)
HGB BLD-MCNC: 12.7 G/DL (ref 12–16)
LEFT ATRIUM VOLUME AREA LENGTH INDEX BSA: 15.4 ML/M2
LEFT VENTRICLE INTERNAL DIMENSION DIASTOLE: 5.59 CM (ref 3.5–6)
LEFT VENTRICULAR OUTFLOW TRACT DIAMETER: 2.03 CM
MAGNESIUM SERPL-MCNC: 2.05 MG/DL (ref 1.6–2.4)
MCH RBC QN AUTO: 29.9 PG (ref 26–34)
MCHC RBC AUTO-ENTMCNC: 35.1 G/DL (ref 32–36)
MCV RBC AUTO: 85 FL (ref 80–100)
MITRAL VALVE E/A RATIO: 1.05
NRBC BLD-RTO: 0 /100 WBCS (ref 0–0)
P AXIS: 42 DEGREES
P OFFSET: 199 MS
P ONSET: 142 MS
PLATELET # BLD AUTO: 286 X10*3/UL (ref 150–450)
POTASSIUM SERPL-SCNC: 3 MMOL/L (ref 3.5–5.3)
PR INTERVAL: 148 MS
Q ONSET: 216 MS
QRS COUNT: 16 BEATS
QRS DURATION: 86 MS
QT INTERVAL: 350 MS
QTC CALCULATION(BAZETT): 439 MS
QTC FREDERICIA: 407 MS
R AXIS: 12 DEGREES
RBC # BLD AUTO: 4.25 X10*6/UL (ref 4–5.2)
RIGHT VENTRICLE FREE WALL PEAK S': 18 CM/S
S PYO THROAT QL CULT: NORMAL
SODIUM SERPL-SCNC: 137 MMOL/L (ref 136–145)
T AXIS: -5 DEGREES
T OFFSET: 391 MS
TRICUSPID ANNULAR PLANE SYSTOLIC EXCURSION: 2.2 CM
VENTRICULAR RATE: 95 BPM
WBC # BLD AUTO: 12.5 X10*3/UL (ref 4.4–11.3)

## 2025-06-27 PROCEDURE — 36415 COLL VENOUS BLD VENIPUNCTURE: CPT

## 2025-06-27 PROCEDURE — C8929 TTE W OR WO FOL WCON,DOPPLER: HCPCS

## 2025-06-27 PROCEDURE — 1200000002 HC GENERAL ROOM WITH TELEMETRY DAILY

## 2025-06-27 PROCEDURE — 99223 1ST HOSP IP/OBS HIGH 75: CPT | Performed by: PSYCHIATRY & NEUROLOGY

## 2025-06-27 PROCEDURE — 2500000001 HC RX 250 WO HCPCS SELF ADMINISTERED DRUGS (ALT 637 FOR MEDICARE OP)

## 2025-06-27 PROCEDURE — 99232 SBSQ HOSP IP/OBS MODERATE 35: CPT | Performed by: INTERNAL MEDICINE

## 2025-06-27 PROCEDURE — 83735 ASSAY OF MAGNESIUM: CPT

## 2025-06-27 PROCEDURE — 85027 COMPLETE CBC AUTOMATED: CPT

## 2025-06-27 PROCEDURE — 2500000004 HC RX 250 GENERAL PHARMACY W/ HCPCS (ALT 636 FOR OP/ED): Mod: JZ

## 2025-06-27 PROCEDURE — 93306 TTE W/DOPPLER COMPLETE: CPT

## 2025-06-27 PROCEDURE — 80048 BASIC METABOLIC PNL TOTAL CA: CPT

## 2025-06-27 RX ORDER — ESCITALOPRAM OXALATE 10 MG/1
10 TABLET ORAL DAILY
Status: DISCONTINUED | OUTPATIENT
Start: 2025-06-28 | End: 2025-07-01 | Stop reason: HOSPADM

## 2025-06-27 RX ADMIN — HUMAN ALBUMIN MICROSPHERES AND PERFLUTREN 0.5 ML: 10; .22 INJECTION, SOLUTION INTRAVENOUS at 08:53

## 2025-06-27 RX ADMIN — AMPICILLIN SODIUM AND SULBACTAM SODIUM 3 G: 2; 1 INJECTION, POWDER, FOR SOLUTION INTRAMUSCULAR; INTRAVENOUS at 06:41

## 2025-06-27 RX ADMIN — ENOXAPARIN SODIUM 40 MG: 40 INJECTION SUBCUTANEOUS at 21:34

## 2025-06-27 RX ADMIN — AMPICILLIN SODIUM AND SULBACTAM SODIUM 3 G: 2; 1 INJECTION, POWDER, FOR SOLUTION INTRAMUSCULAR; INTRAVENOUS at 12:12

## 2025-06-27 RX ADMIN — ACETAMINOPHEN 975 MG: 325 TABLET ORAL at 16:08

## 2025-06-27 RX ADMIN — ENOXAPARIN SODIUM 40 MG: 40 INJECTION SUBCUTANEOUS at 08:49

## 2025-06-27 RX ADMIN — AMPICILLIN SODIUM AND SULBACTAM SODIUM 3 G: 2; 1 INJECTION, POWDER, FOR SOLUTION INTRAMUSCULAR; INTRAVENOUS at 17:35

## 2025-06-27 RX ADMIN — AMPICILLIN SODIUM AND SULBACTAM SODIUM 3 G: 2; 1 INJECTION, POWDER, FOR SOLUTION INTRAMUSCULAR; INTRAVENOUS at 00:50

## 2025-06-27 ASSESSMENT — COGNITIVE AND FUNCTIONAL STATUS - GENERAL
DAILY ACTIVITIY SCORE: 24
MOBILITY SCORE: 24

## 2025-06-27 ASSESSMENT — PAIN - FUNCTIONAL ASSESSMENT
PAIN_FUNCTIONAL_ASSESSMENT: 0-10

## 2025-06-27 ASSESSMENT — PAIN SCALES - GENERAL
PAINLEVEL_OUTOF10: 0 - NO PAIN
PAINLEVEL_OUTOF10: 3

## 2025-06-27 ASSESSMENT — PAIN DESCRIPTION - LOCATION: LOCATION: CHEST

## 2025-06-27 ASSESSMENT — PAIN DESCRIPTION - DESCRIPTORS: DESCRIPTORS: ACHING

## 2025-06-27 NOTE — CONSULTS
Reason For Consult  SI without plan s/p admission discharge from [facility] for similar complaints    History Of Present Illness  Marian Villegas is a 30 y.o. female, PMH endocarditis and pulmonic valve thrombus ,  lives with mother, works in restaurant,  admitted 25 with SOB, chest pain, cough - RML PNA due to Haemophilus Influenza. Pt was seen at [facility] a few days earlier and treated for URI, sent home, but got worse. WBC was 24, now 12 - on Unasyn q 6 hrs    Pt was seen by psychiatry at [facility] for SI, re-expressed thoughts at [facility].   Tox was pos amphetamine and THC  Per behavioral assessment  During triage pt was asked about SI, pt states “I answered honestly, I had a plan, yes, but I didn’t go through with it.” LSW asks if pt intended to go through with it, pt states “briefly, but I thought about my family.” She denies plan or intent now. She explains that she has been losing a lot of friends/relationships that she considered her support system. She denies that these thoughts have been persistent, states “they come and go but are never persistent.” Reports that she “struggles to get myself into a different state of mind.” Reports difficult sleeping, lack of appetite and energy.     I'm trying to figure everything out, I came in for one thing and they keep finding something else.   Was in [facility] - sore throat, swollen Lns - couldn't swallow  They gave some medication and throat was a little better,   But then chest started hurting so I came here.     I'm having a hard time, not understanding what is happening, they are talking about the flu  Pt tearful and overwhelmed. Very anxious  Gets panicked in hospital - hospitalized 2 yrs ago endocarditis and cardiac thrombus.     Onset suicidal ideations began age 14, gets suicidal a few times a year when under stress - always in fall or New years around when grmo passed , aunt  2021, uncle  end of 2020  Pt suddenly understands that  "she gets suicidal around the anniversaries of her loves ones deaths .     Sleep generally not good, always tired  Denies any withdrawal symptoms for THC, does not think she smokes \"a lot\".     Reviewed test results and circumstances with pt step by step.   Pt chart says lupus - pt correctly reports that test was negative, she does not have lupus.     Pt amenable to medication (lexapro) and referral for grief counseling to [facility] in [place] (mostly virtual appts).     Past Psych History  Negative for meds, therapy, Not open anywhere    Past Medical History  She has no past medical history on file.    Surgical History  She has no past surgical history on file.     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   She reports that she has been smoking cigarettes. She does not have any smokeless tobacco history on file. No history on file for alcohol use and drug use.    Family History  Family History[1]     Allergies  Coconut    Physical Exam  Physical Exam  Psychiatric:         Attention and Perception: Attention normal. She does not perceive auditory or visual hallucinations.         Mood and Affect: Mood is anxious and depressed. Affect is blunt and tearful.         Speech: Speech normal.         Behavior: Behavior is slowed.         Thought Content: Thought content is not paranoid or delusional. Thought content includes suicidal ideation. Thought content does not include homicidal ideation.         Cognition and Memory: Cognition and memory normal.         Judgment: Judgment is impulsive.      Comments: Pt denies intent to harm self, feels better after understanding PNA and how she feels. Has some reactivity.      Last Recorded Vitals  Blood pressure 131/84, pulse 98, temperature 36.6 °C (97.9 °F), temperature source Temporal, resp. rate 18, height 1.803 m (5' 11\"), weight 145 kg (319 lb), SpO2 98%.    Relevant " Results  Scheduled medications  ampicillin-sulbactam, 3 g, intravenous, q6h  enoxaparin, 40 mg, subcutaneous, q12h ASHLY  perflutren lipid microspheres, 0.5-10 mL of dilution, intravenous, Once in imaging  polyethylene glycol, 17 g, oral, Daily    Results for orders placed or performed during the hospital encounter of 06/25/25 (from the past 96 hours)   CBC and Auto Differential   Result Value Ref Range    WBC 23.7 (H) 4.4 - 11.3 x10*3/uL    nRBC 0.0 0.0 - 0.0 /100 WBCs    RBC 4.79 4.00 - 5.20 x10*6/uL    Hemoglobin 14.0 12.0 - 16.0 g/dL    Hematocrit 42.6 36.0 - 46.0 %    MCV 89 80 - 100 fL    MCH 29.2 26.0 - 34.0 pg    MCHC 32.9 32.0 - 36.0 g/dL    RDW 14.3 11.5 - 14.5 %    Platelets 288 150 - 450 x10*3/uL    Immature Granulocytes %, Automated 1.4 (H) 0.0 - 0.9 %    Immature Granulocytes Absolute, Automated 0.33 0.00 - 0.70 x10*3/uL   Magnesium   Result Value Ref Range    Magnesium 1.93 1.60 - 2.40 mg/dL   Comprehensive metabolic panel   Result Value Ref Range    Glucose 142 (H) 74 - 99 mg/dL    Sodium 133 (L) 136 - 145 mmol/L    Potassium 3.1 (L) 3.5 - 5.3 mmol/L    Chloride 100 98 - 107 mmol/L    Bicarbonate 20 (L) 21 - 32 mmol/L    Anion Gap 16 10 - 20 mmol/L    Urea Nitrogen 8 6 - 23 mg/dL    Creatinine 0.66 0.50 - 1.05 mg/dL    eGFR >90 >60 mL/min/1.73m*2    Calcium 9.3 8.6 - 10.3 mg/dL    Albumin 3.5 3.4 - 5.0 g/dL    Alkaline Phosphatase 97 33 - 110 U/L    Total Protein 7.3 6.4 - 8.2 g/dL    AST 11 9 - 39 U/L    Bilirubin, Total 0.8 0.0 - 1.2 mg/dL    ALT 20 7 - 45 U/L   Lactate   Result Value Ref Range    Lactate 2.2 (H) 0.4 - 2.0 mmol/L   Troponin I, High Sensitivity   Result Value Ref Range    Troponin I, High Sensitivity 35 (H) 0 - 13 ng/L   B-Type Natriuretic Peptide   Result Value Ref Range    BNP 60 0 - 99 pg/mL   D-Dimer, VTE Exclusion   Result Value Ref Range    D-Dimer, Quantitative VTE Exclusion 2,313 (H) <=500 ng/mL FEU   Manual Differential   Result Value Ref Range    Neutrophils %, Manual 94.0  40.0 - 80.0 %    Lymphocytes %, Manual 6.0 13.0 - 44.0 %    Monocytes %, Manual 0.0 2.0 - 10.0 %    Eosinophils %, Manual 0.0 0.0 - 6.0 %    Basophils %, Manual 0.0 0.0 - 2.0 %    Seg Neutrophils Absolute, Manual 22.28 (H) 1.20 - 7.00 x10*3/uL    Lymphocytes Absolute, Manual 1.42 1.20 - 4.80 x10*3/uL    Monocytes Absolute, Manual 0.00 (L) 0.10 - 1.00 x10*3/uL    Eosinophils Absolute, Manual 0.00 0.00 - 0.70 x10*3/uL    Basophils Absolute, Manual 0.00 0.00 - 0.10 x10*3/uL    Total Cells Counted 100     RBC Morphology No significant RBC morphology present    Human Chorionic Gonadotropin, Serum Quantitative   Result Value Ref Range    HCG, Beta-Quantitative 2 <5 mIU/mL   ECG 12 lead   Result Value Ref Range    Ventricular Rate 131 BPM    Atrial Rate 131 BPM    MO Interval 136 ms    QRS Duration 74 ms    QT Interval 304 ms    QTC Calculation(Bazett) 448 ms    P Axis 59 degrees    R Axis 33 degrees    T Axis -16 degrees    QRS Count 22 beats    Q Onset 218 ms    P Onset 150 ms    P Offset 204 ms    T Offset 370 ms    QTC Fredericia 394 ms   Blood Gas Venous Full Panel   Result Value Ref Range    POCT pH, Venous 7.48 (H) 7.33 - 7.43 pH    POCT pCO2, Venous 33 (L) 41 - 51 mm Hg    POCT pO2, Venous 69 (H) 35 - 45 mm Hg    POCT SO2, Venous 94 (H) 45 - 75 %    POCT Oxy Hemoglobin, Venous 91.2 (H) 45.0 - 75.0 %    POCT Hematocrit Calculated, Venous 43.0 36.0 - 46.0 %    POCT Sodium, Venous 131 (L) 136 - 145 mmol/L    POCT Potassium, Venous 3.0 (L) 3.5 - 5.3 mmol/L    POCT Chloride, Venous 100 98 - 107 mmol/L    POCT Ionized Calicum, Venous 1.25 1.10 - 1.33 mmol/L    POCT Glucose, Venous 132 (H) 74 - 99 mg/dL    POCT Lactate, Venous 1.2 0.4 - 2.0 mmol/L    POCT Base Excess, Venous 1.6 -2.0 - 3.0 mmol/L    POCT HCO3 Calculated, Venous 24.6 22.0 - 26.0 mmol/L    POCT Hemoglobin, Venous 14.3 12.0 - 16.0 g/dL    POCT Anion Gap, Venous 9.0 (L) 10.0 - 25.0 mmol/L    Patient Temperature 37.0 degrees Celsius    FiO2 21 %   Blood  Culture    Specimen: Peripheral Venipuncture; Blood culture   Result Value Ref Range    Blood Culture Haemophilus influenzae (A)     BLOOD CULTURE BOTTLE  Positive Anaerobic Bottle     Beta Lactamase (Cefinase) Negative     Gram Stain See additional stain findings (AA)    Blood Culture    Specimen: Peripheral Venipuncture; Blood culture   Result Value Ref Range    Blood Culture Haemophilus influenzae (A)     BLOOD CULTURE BOTTLE  Positive Anaerobic Bottle     Beta Lactamase (Cefinase)      Gram Stain Gram negative bacilli (AA)    Wayson stain    Specimen: Peripheral Venipuncture; Blood culture   Result Value Ref Range    Wayson stain Bacilli (A)    BCID-GN    Specimen: Peripheral Venipuncture; Blood culture   Result Value Ref Range    Haemophilus influenzae Detected (AA) Not Detected   Group A Streptococcus, Culture    Specimen: Throat/Pharynx; Swab   Result Value Ref Range    Group A Strep Screen, Culture No Group A Streptococcus (GAS) isolated    Sars-CoV-2 and Influenza A/B PCR   Result Value Ref Range    Flu A Result Not Detected Not Detected    Flu B Result Not Detected Not Detected    Coronavirus 2019, PCR Not Detected Not Detected   hCG, Urine, Qualitative   Result Value Ref Range    HCG, Urine NEGATIVE NEGATIVE   Urinalysis with Reflex Culture and Microscopic   Result Value Ref Range    Color, Urine Yellow Light-Yellow, Yellow, Dark-Yellow    Appearance, Urine Turbid (N) Clear    Specific Gravity, Urine 1.035 1.005 - 1.035    pH, Urine 6.5 5.0, 5.5, 6.0, 6.5, 7.0, 7.5, 8.0    Protein, Urine 200 (2+) (A) NEGATIVE, 10 (TRACE), 20 (TRACE) mg/dL    Glucose, Urine 70 (1+) (A) Normal mg/dL    Blood, Urine 0.03 (TRACE) (A) NEGATIVE mg/dL    Ketones, Urine 10 (1+) (A) NEGATIVE mg/dL    Bilirubin, Urine 0.5 (1+) (A) NEGATIVE mg/dL    Urobilinogen, Urine 6 (2+) (A) Normal mg/dL    Nitrite, Urine NEGATIVE NEGATIVE    Leukocyte Esterase, Urine 25 G[place]ito/uL (A) NEGATIVE   Microscopic Only, Urine   Result Value Ref Range     WBC, Urine 11-20 (A) 1-5, NONE /HPF    RBC, Urine 3-5 NONE, 1-2, 3-5 /HPF    Squamous Epithelial Cells, Urine 10-25 (FEW) Reference range not established. /HPF    Mucus, Urine 2+ Reference range not established. /LPF   Urine Culture    Specimen: Clean Catch/Voided; Urine   Result Value Ref Range    Urine Culture       Growth indicates contamination with periurethral atif. Repeat culture if clinically indicated.   Legionella Antigen, Urine    Specimen: Clean Catch/Voided; Urine   Result Value Ref Range    L. pneumophila Urine Ag Negative Negative   Streptococcus pneumoniae Antigen, Urine    Specimen: Clean Catch/Voided; Urine   Result Value Ref Range    Streptococcus pneumoniae Ag, Urine Negative Negative   Lactate   Result Value Ref Range    Lactate 1.3 0.4 - 2.0 mmol/L   Troponin I, High Sensitivity, Initial   Result Value Ref Range    Troponin I, High Sensitivity 4 0 - 13 ng/L   Procalcitonin   Result Value Ref Range    Procalcitonin 0.94 (H) <=0.07 ng/mL   Basic metabolic panel   Result Value Ref Range    Glucose 92 74 - 99 mg/dL    Sodium 136 136 - 145 mmol/L    Potassium 3.2 (L) 3.5 - 5.3 mmol/L    Chloride 103 98 - 107 mmol/L    Bicarbonate 21 21 - 32 mmol/L    Anion Gap 15 10 - 20 mmol/L    Urea Nitrogen 6 6 - 23 mg/dL    Creatinine 0.62 0.50 - 1.05 mg/dL    eGFR >90 >60 mL/min/1.73m*2    Calcium 9.1 8.6 - 10.3 mg/dL   CBC   Result Value Ref Range    WBC 23.2 (H) 4.4 - 11.3 x10*3/uL    nRBC 0.0 0.0 - 0.0 /100 WBCs    RBC 4.37 4.00 - 5.20 x10*6/uL    Hemoglobin 12.8 12.0 - 16.0 g/dL    Hematocrit 37.4 36.0 - 46.0 %    MCV 86 80 - 100 fL    MCH 29.3 26.0 - 34.0 pg    MCHC 34.2 32.0 - 36.0 g/dL    RDW 13.9 11.5 - 14.5 %    Platelets 285 150 - 450 x10*3/uL   Magnesium   Result Value Ref Range    Magnesium 2.23 1.60 - 2.40 mg/dL   Electrocardiogram, 12-lead PRN ACS symptoms   Result Value Ref Range    Ventricular Rate 95 BPM    Atrial Rate 95 BPM    AR Interval 148 ms    QRS Duration 86 ms    QT Interval 350 ms     QTC Calculation(Bazett) 439 ms    P Axis 42 degrees    R Axis 12 degrees    T Axis -5 degrees    QRS Count 16 beats    Q Onset 216 ms    P Onset 142 ms    P Offset 199 ms    T Offset 391 ms    QTC Fredericia 407 ms   Basic metabolic panel   Result Value Ref Range    Glucose 104 (H) 74 - 99 mg/dL    Sodium 137 136 - 145 mmol/L    Potassium 3.0 (L) 3.5 - 5.3 mmol/L    Chloride 101 98 - 107 mmol/L    Bicarbonate 28 21 - 32 mmol/L    Anion Gap 11 10 - 20 mmol/L    Urea Nitrogen 6 6 - 23 mg/dL    Creatinine 0.56 0.50 - 1.05 mg/dL    eGFR >90 >60 mL/min/1.73m*2    Calcium 8.7 8.6 - 10.3 mg/dL   CBC   Result Value Ref Range    WBC 12.5 (H) 4.4 - 11.3 x10*3/uL    nRBC 0.0 0.0 - 0.0 /100 WBCs    RBC 4.25 4.00 - 5.20 x10*6/uL    Hemoglobin 12.7 12.0 - 16.0 g/dL    Hematocrit 36.2 36.0 - 46.0 %    MCV 85 80 - 100 fL    MCH 29.9 26.0 - 34.0 pg    MCHC 35.1 32.0 - 36.0 g/dL    RDW 13.9 11.5 - 14.5 %    Platelets 286 150 - 450 x10*3/uL   Magnesium   Result Value Ref Range    Magnesium 2.05 1.60 - 2.40 mg/dL   Transthoracic Echo Complete   Result Value Ref Range    AV pk cheyanne 1.40 m/s    AV mn grad 5 mmHg    LVOT diam 2.03 cm    MV E/A ratio 1.05     LA vol index A/L 15.4 ml/m2    Tricuspid annular plane systolic excursion 2.2 cm    LV EF 55 %    RV free wall pk S' 18.00 cm/s    LV GLS 17.0 %    LVIDd 5.59 cm    Aortic Valve Area by Continuity of VTI 2.68 cm2    Aortic Valve Area by Continuity of Peak Velocity 2.62 cm2    AV pk grad 8 mmHg    LV A4C EF 58.2      Assessment/Plan     IMP:  Recurrent Major Depression moderate  Complicated Bereavement  Anxiety, possible PTSD from endocarditis hospitalization  2 yrs ago.   Cannabis use    PLAN:  Lexapro 10 mg daily  Refer to [facility] for bereavement counseling  [address] [facility] [address]  [phone]   [email or web address]    I spent 60 minutes in the professional and overall care of this patient.      Ora Contreras MD    Virtual or Telephone  Consent    An interactive audio and video telecommunication system which permits real time communications between the patient (at the originating site) and provider (at the distant site) was utilized to provide this telehealth service.   Verbal consent was requested and obtained from Marian Villegas on this date, 06/27/25 for a telehealth visit and the patient's location was confirmed at the time of the visit.             [1] No family history on file.

## 2025-06-27 NOTE — PROGRESS NOTES
06/27/25 1255   Discharge Planning   Expected Discharge Disposition Home     Pt  day 2 of admission presenting with Pneumonia of right middle lobe due to infectious organism. Cont on IV atbs. Psych consult pend. Adod 1-2 days.

## 2025-06-27 NOTE — CARE PLAN
The clinical goals for the shift include maintain safety    Over the shift, the patient did make progress toward the following goals. Barriers to progression include       Problem: Pain - Adult  Goal: Verbalizes/displays adequate comfort level or baseline comfort level  Outcome: Progressing  Flowsheets (Taken 6/27/2025 1340)  Verbalizes/displays adequate comfort level or baseline comfort level:   Encourage patient to monitor pain and request assistance   Assess pain using appropriate pain scale   Administer analgesics based on type and severity of pain and evaluate response   Implement non-pharmacological measures as appropriate and evaluate response   Consider cultural and social influences on pain and pain management   Notify Licensed Independent Practitioner if interventions unsuccessful or patient reports new pain     Problem: Safety - Adult  Goal: Free from fall injury  Outcome: Progressing  Flowsheets (Taken 6/27/2025 1340)  Free from fall injury: Instruct family/caregiver on patient safety     Problem: Discharge Planning  Goal: Discharge to home or other facility with appropriate resources  Outcome: Progressing  Flowsheets (Taken 6/27/2025 1340)  Discharge to home or other facility with appropriate resources:   Identify barriers to discharge with patient and caregiver   Arrange for needed discharge resources and transportation as appropriate   Identify discharge learning needs (meds, wound care, etc)   Arrange for interpreters to assist at discharge as needed   Refer to discharge planning if patient needs post-hospital services based on physician order or complex needs related to functional status, cognitive ability or social support system     Problem: Chronic Conditions and Co-morbidities  Goal: Patient's chronic conditions and co-morbidity symptoms are monitored and maintained or improved  Outcome: Progressing  Flowsheets (Taken 6/27/2025 1340)  Care Plan - Patient's Chronic Conditions and Co-Morbidity  Symptoms are Monitored and Maintained or Improved:   Monitor and assess patient's chronic conditions and comorbid symptoms for stability, deterioration, or improvement   Collaborate with multidisciplinary team to address chronic and comorbid conditions and prevent exacerbation or deterioration   Update acute care plan with appropriate goals if chronic or comorbid symptoms are exacerbated and prevent overall improvement and discharge     Problem: Nutrition  Goal: Nutrient intake appropriate for maintaining nutritional needs  Outcome: Progressing

## 2025-06-28 LAB
ANION GAP SERPL CALC-SCNC: 17 MMOL/L (ref 10–20)
B-LACTAMASE ORGANISM ISLT: ABNORMAL
B-LACTAMASE ORGANISM ISLT: NEGATIVE
BACTERIA BLD AEROBE CULT: ABNORMAL
BACTERIA BLD AEROBE CULT: ABNORMAL
BACTERIA BLD CULT: ABNORMAL
BACTERIA BLD CULT: ABNORMAL
BUN SERPL-MCNC: 5 MG/DL (ref 6–23)
CALCIUM SERPL-MCNC: 8.6 MG/DL (ref 8.6–10.3)
CHLORIDE SERPL-SCNC: 99 MMOL/L (ref 98–107)
CO2 SERPL-SCNC: 24 MMOL/L (ref 21–32)
CREAT SERPL-MCNC: 0.54 MG/DL (ref 0.5–1.05)
EGFRCR SERPLBLD CKD-EPI 2021: >90 ML/MIN/1.73M*2
ERYTHROCYTE [DISTWIDTH] IN BLOOD BY AUTOMATED COUNT: 13.9 % (ref 11.5–14.5)
GLUCOSE SERPL-MCNC: 96 MG/DL (ref 74–99)
GRAM STN SPEC: ABNORMAL
GRAM STN SPEC: ABNORMAL
HCT VFR BLD AUTO: 35.6 % (ref 36–46)
HGB BLD-MCNC: 11.7 G/DL (ref 12–16)
MAGNESIUM SERPL-MCNC: 2.02 MG/DL (ref 1.6–2.4)
MCH RBC QN AUTO: 28.5 PG (ref 26–34)
MCHC RBC AUTO-ENTMCNC: 32.9 G/DL (ref 32–36)
MCV RBC AUTO: 87 FL (ref 80–100)
NRBC BLD-RTO: 0 /100 WBCS (ref 0–0)
PLATELET # BLD AUTO: 333 X10*3/UL (ref 150–450)
POTASSIUM SERPL-SCNC: 3.2 MMOL/L (ref 3.5–5.3)
RBC # BLD AUTO: 4.1 X10*6/UL (ref 4–5.2)
SODIUM SERPL-SCNC: 137 MMOL/L (ref 136–145)
WBC # BLD AUTO: 13.9 X10*3/UL (ref 4.4–11.3)

## 2025-06-28 PROCEDURE — 2500000001 HC RX 250 WO HCPCS SELF ADMINISTERED DRUGS (ALT 637 FOR MEDICARE OP): Performed by: INTERNAL MEDICINE

## 2025-06-28 PROCEDURE — 85027 COMPLETE CBC AUTOMATED: CPT

## 2025-06-28 PROCEDURE — 2500000001 HC RX 250 WO HCPCS SELF ADMINISTERED DRUGS (ALT 637 FOR MEDICARE OP)

## 2025-06-28 PROCEDURE — 2500000001 HC RX 250 WO HCPCS SELF ADMINISTERED DRUGS (ALT 637 FOR MEDICARE OP): Performed by: PSYCHIATRY & NEUROLOGY

## 2025-06-28 PROCEDURE — 2500000004 HC RX 250 GENERAL PHARMACY W/ HCPCS (ALT 636 FOR OP/ED): Mod: JZ

## 2025-06-28 PROCEDURE — 80048 BASIC METABOLIC PNL TOTAL CA: CPT

## 2025-06-28 PROCEDURE — 83735 ASSAY OF MAGNESIUM: CPT

## 2025-06-28 PROCEDURE — 2500000004 HC RX 250 GENERAL PHARMACY W/ HCPCS (ALT 636 FOR OP/ED): Mod: JZ | Performed by: INTERNAL MEDICINE

## 2025-06-28 PROCEDURE — 1200000002 HC GENERAL ROOM WITH TELEMETRY DAILY

## 2025-06-28 PROCEDURE — 36415 COLL VENOUS BLD VENIPUNCTURE: CPT

## 2025-06-28 PROCEDURE — 99232 SBSQ HOSP IP/OBS MODERATE 35: CPT | Performed by: INTERNAL MEDICINE

## 2025-06-28 RX ORDER — MORPHINE SULFATE 4 MG/ML
4 INJECTION, SOLUTION INTRAMUSCULAR; INTRAVENOUS ONCE
Status: COMPLETED | OUTPATIENT
Start: 2025-06-28 | End: 2025-06-28

## 2025-06-28 RX ORDER — OXYCODONE HYDROCHLORIDE 5 MG/1
10 TABLET ORAL EVERY 4 HOURS PRN
Refills: 0 | Status: DISCONTINUED | OUTPATIENT
Start: 2025-06-28 | End: 2025-07-01 | Stop reason: HOSPADM

## 2025-06-28 RX ORDER — OXYCODONE HYDROCHLORIDE 5 MG/1
5 TABLET ORAL EVERY 4 HOURS PRN
Refills: 0 | Status: DISCONTINUED | OUTPATIENT
Start: 2025-06-28 | End: 2025-07-01 | Stop reason: HOSPADM

## 2025-06-28 RX ADMIN — ESCITALOPRAM OXALATE 10 MG: 10 TABLET ORAL at 08:51

## 2025-06-28 RX ADMIN — AMPICILLIN SODIUM AND SULBACTAM SODIUM 3 G: 2; 1 INJECTION, POWDER, FOR SOLUTION INTRAMUSCULAR; INTRAVENOUS at 13:32

## 2025-06-28 RX ADMIN — OXYCODONE HYDROCHLORIDE 10 MG: 5 TABLET ORAL at 04:02

## 2025-06-28 RX ADMIN — BENZONATATE 100 MG: 100 CAPSULE ORAL at 01:32

## 2025-06-28 RX ADMIN — ENOXAPARIN SODIUM 40 MG: 40 INJECTION SUBCUTANEOUS at 08:50

## 2025-06-28 RX ADMIN — GUAIFENESIN SYRUP AND DEXTROMETHORPHAN 5 ML: 100; 10 SYRUP ORAL at 01:32

## 2025-06-28 RX ADMIN — AMPICILLIN SODIUM AND SULBACTAM SODIUM 3 G: 2; 1 INJECTION, POWDER, FOR SOLUTION INTRAMUSCULAR; INTRAVENOUS at 01:22

## 2025-06-28 RX ADMIN — GUAIFENESIN SYRUP AND DEXTROMETHORPHAN 5 ML: 100; 10 SYRUP ORAL at 12:17

## 2025-06-28 RX ADMIN — AMPICILLIN SODIUM AND SULBACTAM SODIUM 3 G: 2; 1 INJECTION, POWDER, FOR SOLUTION INTRAMUSCULAR; INTRAVENOUS at 21:35

## 2025-06-28 RX ADMIN — AMPICILLIN SODIUM AND SULBACTAM SODIUM 3 G: 2; 1 INJECTION, POWDER, FOR SOLUTION INTRAMUSCULAR; INTRAVENOUS at 08:50

## 2025-06-28 RX ADMIN — OXYCODONE HYDROCHLORIDE 10 MG: 5 TABLET ORAL at 12:17

## 2025-06-28 RX ADMIN — ENOXAPARIN SODIUM 40 MG: 40 INJECTION SUBCUTANEOUS at 21:35

## 2025-06-28 RX ADMIN — MORPHINE SULFATE 4 MG: 4 INJECTION, SOLUTION INTRAMUSCULAR; INTRAVENOUS at 19:49

## 2025-06-28 ASSESSMENT — PAIN DESCRIPTION - LOCATION
LOCATION: CHEST
LOCATION: THROAT
LOCATION: CHEST

## 2025-06-28 ASSESSMENT — PAIN SCALES - GENERAL
PAINLEVEL_OUTOF10: 10 - WORST POSSIBLE PAIN
PAINLEVEL_OUTOF10: 0 - NO PAIN
PAINLEVEL_OUTOF10: 7
PAINLEVEL_OUTOF10: 10 - WORST POSSIBLE PAIN
PAINLEVEL_OUTOF10: 5 - MODERATE PAIN

## 2025-06-28 ASSESSMENT — PAIN DESCRIPTION - DESCRIPTORS
DESCRIPTORS: DISCOMFORT
DESCRIPTORS: BURNING;ACHING
DESCRIPTORS: SHARP;SORE

## 2025-06-28 ASSESSMENT — PAIN - FUNCTIONAL ASSESSMENT
PAIN_FUNCTIONAL_ASSESSMENT: 0-10

## 2025-06-28 ASSESSMENT — COGNITIVE AND FUNCTIONAL STATUS - GENERAL
DAILY ACTIVITIY SCORE: 24
MOBILITY SCORE: 24

## 2025-06-28 NOTE — CARE PLAN
The patient's goals for the shift include      The clinical goals for the shift include maintain safety      Problem: Pain - Adult  Goal: Verbalizes/displays adequate comfort level or baseline comfort level  Outcome: Progressing     Problem: Safety - Adult  Goal: Free from fall injury  Outcome: Progressing     Problem: Chronic Conditions and Co-morbidities  Goal: Patient's chronic conditions and co-morbidity symptoms are monitored and maintained or improved  Outcome: Progressing

## 2025-06-28 NOTE — CARE PLAN
The clinical goals for the shift include safety during shift    Over the shift, the patient did make progress toward the following goals. Barriers to progression include       Problem: Pain - Adult  Goal: Verbalizes/displays adequate comfort level or baseline comfort level  Outcome: Progressing  Flowsheets (Taken 6/28/2025 1547)  Verbalizes/displays adequate comfort level or baseline comfort level:   Encourage patient to monitor pain and request assistance   Assess pain using appropriate pain scale   Administer analgesics based on type and severity of pain and evaluate response   Implement non-pharmacological measures as appropriate and evaluate response   Consider cultural and social influences on pain and pain management   Notify Licensed Independent Practitioner if interventions unsuccessful or patient reports new pain     Problem: Safety - Adult  Goal: Free from fall injury  Outcome: Progressing  Flowsheets (Taken 6/28/2025 1547)  Free from fall injury: Instruct family/caregiver on patient safety     Problem: Discharge Planning  Goal: Discharge to home or other facility with appropriate resources  Outcome: Progressing  Flowsheets (Taken 6/28/2025 1547)  Discharge to home or other facility with appropriate resources:   Identify barriers to discharge with patient and caregiver   Arrange for needed discharge resources and transportation as appropriate   Identify discharge learning needs (meds, wound care, etc)   Arrange for interpreters to assist at discharge as needed   Refer to discharge planning if patient needs post-hospital services based on physician order or complex needs related to functional status, cognitive ability or social support system     Problem: Chronic Conditions and Co-morbidities  Goal: Patient's chronic conditions and co-morbidity symptoms are monitored and maintained or improved  Outcome: Progressing  Flowsheets (Taken 6/28/2025 1547)  Care Plan - Patient's Chronic Conditions and Co-Morbidity  Symptoms are Monitored and Maintained or Improved:   Monitor and assess patient's chronic conditions and comorbid symptoms for stability, deterioration, or improvement   Collaborate with multidisciplinary team to address chronic and comorbid conditions and prevent exacerbation or deterioration   Update acute care plan with appropriate goals if chronic or comorbid symptoms are exacerbated and prevent overall improvement and discharge     Problem: Nutrition  Goal: Nutrient intake appropriate for maintaining nutritional needs  Outcome: Progressing

## 2025-06-28 NOTE — PROGRESS NOTES
Shawn Tomlinson is a 30 y.o. female on day 2 of admission presenting with Pneumonia of right middle lobe due to infectious organism.      Subjective   Patient was seen and examined bedside this morning, stated that she is doing much better, we discussed the result of blood culture that shows haemophilus influenza and we are waiting on sensitivity for discharge.  Patient was informed that a psych consult will place to discuss report of SI, which she denied at this time, but admitted to experiencing previously.       Objective     Last Recorded Vitals  /71 (BP Location: Left arm, Patient Position: Sitting)   Pulse 98   Temp 36.6 °C (97.8 °F) (Temporal)   Resp 18   Wt 145 kg (319 lb)   SpO2 98%   Intake/Output last 3 Shifts:    Intake/Output Summary (Last 24 hours) at 6/27/2025 2255  Last data filed at 6/27/2025 1735  Gross per 24 hour   Intake 1040 ml   Output --   Net 1040 ml       Admission Weight  Weight: 145 kg (319 lb) (06/25/25 0947)    Daily Weight  06/25/25 : 145 kg (319 lb)    Image Results  Transthoracic Echo Complete     [facility], [address] [place] [address]               Tel [phone] and Fax [phone]    TRANSTHORACIC ECHOCARDIOGRAM REPORT       Patient Name:       SHAWN TOMLINSON     Reading Physician:    [id number] Manuel Mckeon MD  Study Date:         6/27/2025           Ordering Provider:    [id number] CHINTAN ERAZO  MRN/PID:            [id number]            Fellow:  Accession#:         [id number]        Nurse:  Date of Birth/Age:  1995 / 30 years Sonographer:          Meryl Austin [facility]  Gender assigned at  F                   Additional Staff:  Birth:  Height:             180.34 cm           Admit Date:           6/23/2025  Weight:             144.70 kg           Admission Status:     Inpatient -                                                                 Priority                                                                discharge  BSA / BMI:          2.57 m2 / 44.49     Encounter#:           [id number]                      kg/m2  Blood Pressure:     112/70 mmHg         Department Location:  [facility] Non                                                                Invasive    Study Type:    TRANSTHORACIC ECHO (TTE) COMPLETE  Diagnosis/ICD: Intracardiac thrombosis, not elsewhere classified-I51.3  Indication:    Intracardiac thrombus, lupus  CPT Code:      Echo Complete w Full [id number]    Patient History:  Smoker:            Current.  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    Study Detail: The following Echo studies were performed: 2D, M-Mode, Doppler and                color flow. Technically challenging study due to body habitus.                Optison used as a contrast agent for endocardial border                definition. Total contrast used for this procedure was 2 mL via IV                push.       PHYSICIAN INTERPRETATION:  Left Ventricle: Left ventricular ejection fraction is low normal calculated by Calderon's biplane at 55%. There are no regional left ventricular wall motion abnormalities. The left ventricular cavity size is normal. There is normal septal and normal posterior left ventricular wall thickness. Left Ventricular Global Longitudinal Strain - 17.0 %. Spectral Doppler shows a normal pattern of left ventricular diastolic filling. Strain values are normal, which imply normal myocardial function. There is no definite left ventricular thrombus visualized.  Left Atrium: The left atrial size is normal.  Right Ventricle: The right ventricle is normal in size. There is normal right ventricular global systolic function.  Right Atrium: The right atrium is normal in size.  Aortic Valve: The aortic valve is trileaflet. The aortic valve area by VTI is 2.68 cmï¿½ with a peak velocity of 1.40  m/s. The peak and mean gradients are 8 mmHg and 5 mmHg, respectively with a dimensionless index of 0.83. There is no evidence of aortic valve regurgitation.  Mitral Valve: The mitral valve is normal in structure. There is no evidence of mitral valve regurgitation. The E Vmax is 0.65 m/s.  Tricuspid Valve: The tricuspid valve is structurally normal. There is trace tricuspid regurgitation.  Pulmonic Valve: The pulmonic valve is structurally normal. There is trace pulmonic valve regurgitation.  Pericardium: Trivial pericardial effusion.  Aorta: The aortic root is normal.  Systemic Veins: The inferior vena cava appears normal in size, with IVC inspiratory collapse greater than 50%.  In comparison to the previous echocardiogram(s): Compared with study dated 8/23/2023, no significant change in particular, no thrombus was seen in the RVOT views.       CONCLUSIONS:   1. Left ventricular ejection fraction is low normal calculated by Calderon's biplane at 55%.   2. Left Ventricular Global Longitudinal Strain - 17.0 %.   3. No left ventricular thrombus visualized.   4. There is normal right ventricular global systolic function.   5. Compared with study dated 8/23/2023, no significant change in particular, no thrombus was seen in the RVOT views.    QUANTITATIVE DATA SUMMARY:     2D MEASUREMENTS:          Normal Ranges:  LAs:             3.81 cm  (2.7-4.0cm)  IVSd:            0.82 cm  (0.6-1.1cm)  LVPWd:           0.91 cm  (0.6-1.1cm)  LVIDd:           5.59 cm  (3.9-5.9cm)  LVIDs:           4.16 cm  LV Mass Index:   71 g/m2  LVEDV Index:     37 ml/m2  LV % FS          25.5 %       LEFT ATRIUM:                  Normal Ranges:  LA Vol A4C:        43.1 ml    (22+/-6mL/m2)  LA Vol A2C:        32.7 ml  LA Vol BP:         39.7 ml  LA Vol Index A4C:  16.8 ml/m2  LA Vol Index A2C:  12.7 ml/m2  LA Vol Index BP:   15.4 ml/m2  LA Area A4C:       17.0 cm2  LA Area A2C:       14.0 cm2  LA Major Axis A4C: 5.7 cm  LA Major Axis A2C: 5.1 cm  LA  Volume Index:   15.5 ml/m2  LA Vol A4C:        39.1 ml  LA Vol A2C:        30.9 ml  LA Vol Index BSA:  13.6 ml/m2       RIGHT ATRIUM:                 Normal Ranges:  RA Vol A4C:        40.2 ml    (8.3-19.5ml)  RA Vol Index A4C:  15.6 ml/m2  RA Area A4C:       14.9 cm2  RA Major Axis A4C: 4.7 cm       AORTA MEASUREMENTS:         Normal Ranges:  Ao Sinus, d:        3.10 cm (2.1-3.5cm)  Ao STJ, d:          2.60 cm (1.7-3.4cm)  Asc Ao, d:          3.00 cm (2.1-3.4cm)       LV SYSTOLIC FUNCTION:                                           Normal Ranges:  EF-A4C View:                      58 %   (>=55%)  EF-A2C View:                      54 %  EF-Biplane:                       55 %  LV EF Reported:                   55 %  Global Longitudinal Strain (GLS): -17.00       LV DIASTOLIC FUNCTION:             Normal Ranges:  MV Peak E:             0.65 m/s    (0.7-1.2 m/s)  MV Peak A:             0.62 m/s    (0.42-0.7 m/s)  E/A Ratio:             1.05        (1.0-2.2)  MV e'                  0.120 m/s   (>8.0)  MV lateral e'          0.15 m/s  MV medial e'           0.09 m/s  MV A Dur:              123.69 msec  E/e' Ratio:            5.43        (<8.0)  a'                     0.07 m/s  PulmV Sys Obi:         37.56 cm/s  PulmV Hatfield Obi:        27.48 cm/s  PulmV S/D Obi:         1.37  PulmV A Revs Obi:      14.89 cm/s  PulmV A Revs Dur:      81.83 msec       MITRAL VALVE:          Normal Ranges:  MV DT:        262 msec (150-240msec)       AORTIC VALVE:                     Normal Ranges:  AoV Vmax:                1.40 m/s (<=1.7m/s)  AoV Peak P.8 mmHg (<20mmHg)  AoV Mean P.5 mmHg (1.7-11.5mmHg)  LVOT Max Obi:            1.14 m/s (<=1.1m/s)  AoV VTI:                 22.54 cm (18-25cm)  LVOT VTI:                18.74 cm  LVOT Diameter:           2.03 cm  (1.8-2.4cm)  AoV Area, VTI:           2.68 cm2 (2.5-5.5cm2)  AoV Area,Vmax:           2.62 cm2 (2.5-4.5cm2)  AoV Dimensionless Index: 0.83       RIGHT  VENTRICLE:  RV Basal 3.20 cm  RV Mid   2.30 cm  RV Major 8.1 cm  TAPSE:   22.0 mm  RV s'    0.18 m/s       TRICUSPID VALVE/RVSP:         Normal Ranges:  Est. RA Pressure:     3  IVC Diam:             1.27 cm       PULMONIC VALVE:          Normal Ranges:  PV Accel Time:  86 msec  (>120ms)  PV Max Obi:     1.2 m/s  (0.6-0.9m/s)  PV Max P.4 mmHg       PULMONARY VEINS:  PulmV A Revs Dur: 81.83 msec  PulmV A Revs Obi: 14.89 cm/s  PulmV Hatfield Obi:   27.48 cm/s  PulmV S/D Obi:    1.37  PulmV Sys Obi:    37.56 cm/s       AORTA:  Asc Ao Diam 3.00 cm       [id number] Manuel Mckeon MD  Electronically signed on 2025 at 9:31:16 AM       ** Final **  Electrocardiogram, 12-lead PRN ACS symptoms  Normal sinus rhythm  Possible Left atrial enlargement  Nonspecific ST and T wave abnormality  Abnormal ECG  When compared with ECG of 2025 09:53, (unconfirmed)  No significant change was found      Physical Exam  Constitutional: Well-developed, alert active, cooperative not in acute distress  Eyes: PERRLA, clear sclera  ENMT: Moist mucosal membranes, no exudate  Head / Neck: Atraumatic, normocephalic, supple neck, JVP not visualized  Lungs: Patent airways, CTABL  Heart: RRR, S1S2, no murmurs appreciated, palpable pulses in all extremities  GI: Soft, NT, ND, bowel sounds present in all quadrants  MSK: Moves all extremities freely, no restriction  of ROM, no joint edema  Extremities: Intact x 4, no peripheral edema  : No Seals catheter inserted  Breast: Deferred  Neurological: AAO x 3 to person, place and date, facial muscles symmetrical, sensation intact, strength 4/4, no acute focal neurological deficits appreciated  Psychological: Appropriate mood and behavior  Relevant Results                    Scheduled medications  Scheduled Medications[1]  Continuous medications  Continuous Medications[2]  PRN medications  PRN Medications[3]      Assessment & Plan  Pneumonia of right middle lobe due to infectious  organism  Sepsis   Hypokalemia  Hx of cardiac thrombus  Hx of positive lupus anticoagulant   Hx of endocarditis     30 YOF PMH lupus anticoagulant, cardiac thrombus, endocarditis, not on blood thinners currently, presenting with sore throat and shortness of breath. Symptoms began 4 days ago with sore throat and sinus congestion        Sepsis without endorgan damage  - Left shift leukocytosis: Neutrophilia with bandemia  - CTA negative for pulmonary embolism but shows pneumonia  - Received ceftriaxone and doxycycline in the ED  - Unasyn 3 g IV piggyback every 8 hours  - Blood cultures: Positive for haemophilus influenza, sensitivity and susceptibility pending  - Mildly elevated procalcitonin level  - Urine antigen for Legionella and Streptococcus pending  - Incentive spirometer  -Dextromethorphan guaifenesin as needed cough  - Albuterol nebulizer every 4 hours as needed shortness of breath     Generalized fatigue  - PT OT: Appreciate evaluation recommendations     Hypokalemia  - Replenished in the ED, a.m. labs shows K 3.2  -Replenished with 20 mEq of KCl x 1  - Continue renal function monitoring     History of cardiac thrombus/endocarditis: Not on any anticoagulation  - Echocardiogram     Recent SI, status post hospitalization at [facility]  - Per  note, patient admits to contemplating suicide without plan  - Psychiatry consulted     Diet  - Regular     DVT prophylaxis: Presented with shortness of breath, weakness, found with pneumonia, need further management, discharge pending clinical improvement         Gabino Villalobos DO           [1] ampicillin-sulbactam, 3 g, intravenous, q6h  enoxaparin, 40 mg, subcutaneous, q12h [facility]  [START ON 6/28/2025] escitalopram, 10 mg, oral, Daily  perflutren lipid microspheres, 0.5-10 mL of dilution, intravenous, Once in imaging  polyethylene glycol, 17 g, oral, Daily  [2]    [3] PRN medications: albuterol, benzonatate, dextromethorphan-guaifenesin,  ondansetron, phenoL

## 2025-06-29 VITALS
HEART RATE: 90 BPM | DIASTOLIC BLOOD PRESSURE: 82 MMHG | SYSTOLIC BLOOD PRESSURE: 132 MMHG | TEMPERATURE: 97.8 F | RESPIRATION RATE: 17 BRPM | OXYGEN SATURATION: 93 % | HEIGHT: 71 IN | BODY MASS INDEX: 41.02 KG/M2 | WEIGHT: 293 LBS

## 2025-06-29 LAB
ANION GAP SERPL CALC-SCNC: 15 MMOL/L (ref 10–20)
BACTERIA BLD CULT: NORMAL
BACTERIA BLD CULT: NORMAL
BUN SERPL-MCNC: 4 MG/DL (ref 6–23)
CALCIUM SERPL-MCNC: 8.5 MG/DL (ref 8.6–10.3)
CHLORIDE SERPL-SCNC: 95 MMOL/L (ref 98–107)
CO2 SERPL-SCNC: 26 MMOL/L (ref 21–32)
CREAT SERPL-MCNC: 0.55 MG/DL (ref 0.5–1.05)
EGFRCR SERPLBLD CKD-EPI 2021: >90 ML/MIN/1.73M*2
ERYTHROCYTE [DISTWIDTH] IN BLOOD BY AUTOMATED COUNT: 13.8 % (ref 11.5–14.5)
GLUCOSE SERPL-MCNC: 119 MG/DL (ref 74–99)
HCT VFR BLD AUTO: 32.8 % (ref 36–46)
HGB BLD-MCNC: 11.9 G/DL (ref 12–16)
MAGNESIUM SERPL-MCNC: 2.06 MG/DL (ref 1.6–2.4)
MCH RBC QN AUTO: 30.4 PG (ref 26–34)
MCHC RBC AUTO-ENTMCNC: 36.3 G/DL (ref 32–36)
MCV RBC AUTO: 84 FL (ref 80–100)
NRBC BLD-RTO: 0 /100 WBCS (ref 0–0)
PLATELET # BLD AUTO: 351 X10*3/UL (ref 150–450)
POTASSIUM SERPL-SCNC: 2.8 MMOL/L (ref 3.5–5.3)
RBC # BLD AUTO: 3.92 X10*6/UL (ref 4–5.2)
SODIUM SERPL-SCNC: 133 MMOL/L (ref 136–145)
WBC # BLD AUTO: 14.2 X10*3/UL (ref 4.4–11.3)

## 2025-06-29 PROCEDURE — 83735 ASSAY OF MAGNESIUM: CPT

## 2025-06-29 PROCEDURE — 1200000002 HC GENERAL ROOM WITH TELEMETRY DAILY

## 2025-06-29 PROCEDURE — 36415 COLL VENOUS BLD VENIPUNCTURE: CPT | Performed by: INTERNAL MEDICINE

## 2025-06-29 PROCEDURE — 2500000001 HC RX 250 WO HCPCS SELF ADMINISTERED DRUGS (ALT 637 FOR MEDICARE OP): Performed by: INTERNAL MEDICINE

## 2025-06-29 PROCEDURE — 2500000002 HC RX 250 W HCPCS SELF ADMINISTERED DRUGS (ALT 637 FOR MEDICARE OP, ALT 636 FOR OP/ED): Performed by: INTERNAL MEDICINE

## 2025-06-29 PROCEDURE — 87040 BLOOD CULTURE FOR BACTERIA: CPT | Mod: AHULAB | Performed by: INTERNAL MEDICINE

## 2025-06-29 PROCEDURE — 2500000004 HC RX 250 GENERAL PHARMACY W/ HCPCS (ALT 636 FOR OP/ED): Mod: JZ | Performed by: INTERNAL MEDICINE

## 2025-06-29 PROCEDURE — 36415 COLL VENOUS BLD VENIPUNCTURE: CPT

## 2025-06-29 PROCEDURE — 80048 BASIC METABOLIC PNL TOTAL CA: CPT

## 2025-06-29 PROCEDURE — 85027 COMPLETE CBC AUTOMATED: CPT

## 2025-06-29 PROCEDURE — 99232 SBSQ HOSP IP/OBS MODERATE 35: CPT | Performed by: INTERNAL MEDICINE

## 2025-06-29 PROCEDURE — 2500000001 HC RX 250 WO HCPCS SELF ADMINISTERED DRUGS (ALT 637 FOR MEDICARE OP): Performed by: PSYCHIATRY & NEUROLOGY

## 2025-06-29 PROCEDURE — 2500000004 HC RX 250 GENERAL PHARMACY W/ HCPCS (ALT 636 FOR OP/ED)

## 2025-06-29 RX ORDER — POTASSIUM CHLORIDE 20 MEQ/1
40 TABLET, EXTENDED RELEASE ORAL ONCE
Status: COMPLETED | OUTPATIENT
Start: 2025-06-29 | End: 2025-06-29

## 2025-06-29 RX ORDER — POTASSIUM CHLORIDE 14.9 MG/ML
20 INJECTION INTRAVENOUS ONCE
Status: COMPLETED | OUTPATIENT
Start: 2025-06-29 | End: 2025-06-29

## 2025-06-29 RX ORDER — MECLIZINE HYDROCHLORIDE 25 MG/1
25 TABLET ORAL 3 TIMES DAILY PRN
Status: DISCONTINUED | OUTPATIENT
Start: 2025-06-29 | End: 2025-06-29

## 2025-06-29 RX ADMIN — POTASSIUM CHLORIDE 20 MEQ: 14.9 INJECTION, SOLUTION INTRAVENOUS at 08:07

## 2025-06-29 RX ADMIN — ENOXAPARIN SODIUM 40 MG: 40 INJECTION SUBCUTANEOUS at 09:23

## 2025-06-29 RX ADMIN — ENOXAPARIN SODIUM 40 MG: 40 INJECTION SUBCUTANEOUS at 22:00

## 2025-06-29 RX ADMIN — AMPICILLIN SODIUM AND SULBACTAM SODIUM 3 G: 2; 1 INJECTION, POWDER, FOR SOLUTION INTRAMUSCULAR; INTRAVENOUS at 22:00

## 2025-06-29 RX ADMIN — ESCITALOPRAM OXALATE 10 MG: 10 TABLET ORAL at 09:23

## 2025-06-29 RX ADMIN — OXYCODONE HYDROCHLORIDE 10 MG: 5 TABLET ORAL at 17:48

## 2025-06-29 RX ADMIN — OXYCODONE HYDROCHLORIDE 10 MG: 5 TABLET ORAL at 03:25

## 2025-06-29 RX ADMIN — AMPICILLIN SODIUM AND SULBACTAM SODIUM 3 G: 2; 1 INJECTION, POWDER, FOR SOLUTION INTRAMUSCULAR; INTRAVENOUS at 16:33

## 2025-06-29 RX ADMIN — AMPICILLIN SODIUM AND SULBACTAM SODIUM 3 G: 2; 1 INJECTION, POWDER, FOR SOLUTION INTRAMUSCULAR; INTRAVENOUS at 10:24

## 2025-06-29 RX ADMIN — POTASSIUM CHLORIDE 40 MEQ: 1500 TABLET, EXTENDED RELEASE ORAL at 08:15

## 2025-06-29 RX ADMIN — POLYETHYLENE GLYCOL 3350 17 G: 17 POWDER, FOR SOLUTION ORAL at 09:23

## 2025-06-29 RX ADMIN — AMPICILLIN SODIUM AND SULBACTAM SODIUM 3 G: 2; 1 INJECTION, POWDER, FOR SOLUTION INTRAMUSCULAR; INTRAVENOUS at 03:31

## 2025-06-29 RX ADMIN — OXYCODONE HYDROCHLORIDE 10 MG: 5 TABLET ORAL at 10:36

## 2025-06-29 ASSESSMENT — PAIN SCALES - GENERAL
PAINLEVEL_OUTOF10: 7
PAINLEVEL_OUTOF10: 7
PAINLEVEL_OUTOF10: 0 - NO PAIN
PAINLEVEL_OUTOF10: 4
PAINLEVEL_OUTOF10: 4
PAINLEVEL_OUTOF10: 0 - NO PAIN
PAINLEVEL_OUTOF10: 8
PAINLEVEL_OUTOF10: 7

## 2025-06-29 ASSESSMENT — PAIN DESCRIPTION - DESCRIPTORS
DESCRIPTORS: TIGHTNESS
DESCRIPTORS: TIGHTNESS
DESCRIPTORS: ACHING

## 2025-06-29 ASSESSMENT — COGNITIVE AND FUNCTIONAL STATUS - GENERAL
MOBILITY SCORE: 24
DAILY ACTIVITIY SCORE: 24

## 2025-06-29 ASSESSMENT — PAIN DESCRIPTION - LOCATION
LOCATION: CHEST
LOCATION: CHEST

## 2025-06-29 ASSESSMENT — PAIN - FUNCTIONAL ASSESSMENT
PAIN_FUNCTIONAL_ASSESSMENT: 0-10

## 2025-06-29 NOTE — PROGRESS NOTES
Shawn Tomlinson is a 30 y.o. female on day 3 of admission presenting with Pneumonia of right middle lobe due to infectious organism.      Subjective   Patient was seen and examined bedside this morning, stated that she spoke to Dr. Contreras, and no review of systems denies having any symptom at this time.  We discussed pending results of sensitivity of haemophilus influenza, as criteria determining discharge.       Objective     Last Recorded Vitals  /79 (BP Location: Left arm, Patient Position: Lying)   Pulse 91   Temp 37.1 °C (98.8 °F) (Temporal)   Resp 18   Wt 145 kg (319 lb)   SpO2 95%   Intake/Output last 3 Shifts:    Intake/Output Summary (Last 24 hours) at 6/28/2025 2029  Last data filed at 6/28/2025 1332  Gross per 24 hour   Intake 780 ml   Output --   Net 780 ml       Admission Weight  Weight: 145 kg (319 lb) (06/25/25 0947)    Daily Weight  06/25/25 : 145 kg (319 lb)    Image Results  Transthoracic Echo Complete     [facility], [address] [place] [address]               Tel [phone] and Fax [phone]    TRANSTHORACIC ECHOCARDIOGRAM REPORT       Patient Name:       SHAWN TOMLINSON     Reading Physician:    [id number] Manuel Mckeon MD  Study Date:         6/27/2025           Ordering Provider:    [id number] CHINTAN ERAZO  MRN/PID:            [id number]            Fellow:  Accession#:         [id number]        Nurse:  Date of Birth/Age:  1995 / 30 years Sonographer:          Meryl Austin [facility]  Gender assigned at  F                   Additional Staff:  Birth:  Height:             180.34 cm           Admit Date:           6/23/2025  Weight:             144.70 kg           Admission Status:     Inpatient -                                                                Priority                                                                 discharge  BSA / BMI:          2.57 m2 / 44.49     Encounter#:           [id number]                      kg/m2  Blood Pressure:     112/70 mmHg         Department Location:  [facility] Non                                                                Invasive    Study Type:    TRANSTHORACIC ECHO (TTE) COMPLETE  Diagnosis/ICD: Intracardiac thrombosis, not elsewhere classified-I51.3  Indication:    Intracardiac thrombus, lupus  CPT Code:      Echo Complete w Full Doppler-[id number]    Patient History:  Smoker:            Current.  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    Study Detail: The following Echo studies were performed: 2D, M-Mode, Doppler and                color flow. Technically challenging study due to body habitus.                Optison used as a contrast agent for endocardial border                definition. Total contrast used for this procedure was 2 mL via IV                push.       PHYSICIAN INTERPRETATION:  Left Ventricle: Left ventricular ejection fraction is low normal calculated by Calderon's biplane at 55%. There are no regional left ventricular wall motion abnormalities. The left ventricular cavity size is normal. There is normal septal and normal posterior left ventricular wall thickness. Left Ventricular Global Longitudinal Strain - 17.0 %. Spectral Doppler shows a normal pattern of left ventricular diastolic filling. Strain values are normal, which imply normal myocardial function. There is no definite left ventricular thrombus visualized.  Left Atrium: The left atrial size is normal.  Right Ventricle: The right ventricle is normal in size. There is normal right ventricular global systolic function.  Right Atrium: The right atrium is normal in size.  Aortic Valve: The aortic valve is trileaflet. The aortic valve area by VTI is 2.68 cmï¿½ with a peak velocity of 1.40 m/s. The peak and mean gradients are 8 mmHg and 5 mmHg, respectively with a dimensionless  index of 0.83. There is no evidence of aortic valve regurgitation.  Mitral Valve: The mitral valve is normal in structure. There is no evidence of mitral valve regurgitation. The E Vmax is 0.65 m/s.  Tricuspid Valve: The tricuspid valve is structurally normal. There is trace tricuspid regurgitation.  Pulmonic Valve: The pulmonic valve is structurally normal. There is trace pulmonic valve regurgitation.  Pericardium: Trivial pericardial effusion.  Aorta: The aortic root is normal.  Systemic Veins: The inferior vena cava appears normal in size, with IVC inspiratory collapse greater than 50%.  In comparison to the previous echocardiogram(s): Compared with study dated 8/23/2023, no significant change in particular, no thrombus was seen in the RVOT views.       CONCLUSIONS:   1. Left ventricular ejection fraction is low normal calculated by Calderon's biplane at 55%.   2. Left Ventricular Global Longitudinal Strain - 17.0 %.   3. No left ventricular thrombus visualized.   4. There is normal right ventricular global systolic function.   5. Compared with study dated 8/23/2023, no significant change in particular, no thrombus was seen in the RVOT views.    QUANTITATIVE DATA SUMMARY:     2D MEASUREMENTS:          Normal Ranges:  LAs:             3.81 cm  (2.7-4.0cm)  IVSd:            0.82 cm  (0.6-1.1cm)  LVPWd:           0.91 cm  (0.6-1.1cm)  LVIDd:           5.59 cm  (3.9-5.9cm)  LVIDs:           4.16 cm  LV Mass Index:   71 g/m2  LVEDV Index:     37 ml/m2  LV % FS          25.5 %       LEFT ATRIUM:                  Normal Ranges:  LA Vol A4C:        43.1 ml    (22+/-6mL/m2)  LA Vol A2C:        32.7 ml  LA Vol BP:         39.7 ml  LA Vol Index A4C:  16.8 ml/m2  LA Vol Index A2C:  12.7 ml/m2  LA Vol Index BP:   15.4 ml/m2  LA Area A4C:       17.0 cm2  LA Area A2C:       14.0 cm2  LA Major Axis A4C: 5.7 cm  LA Major Axis A2C: 5.1 cm  LA Volume Index:   15.5 ml/m2  LA Vol A4C:        39.1 ml  LA Vol A2C:        30.9 ml  LA Vol  Index BSA:  13.6 ml/m2       RIGHT ATRIUM:                 Normal Ranges:  RA Vol A4C:        40.2 ml    (8.3-19.5ml)  RA Vol Index A4C:  15.6 ml/m2  RA Area A4C:       14.9 cm2  RA Major Axis A4C: 4.7 cm       AORTA MEASUREMENTS:         Normal Ranges:  Ao Sinus, d:        3.10 cm (2.1-3.5cm)  Ao STJ, d:          2.60 cm (1.7-3.4cm)  Asc Ao, d:          3.00 cm (2.1-3.4cm)       LV SYSTOLIC FUNCTION:                                           Normal Ranges:  EF-A4C View:                      58 %   (>=55%)  EF-A2C View:                      54 %  EF-Biplane:                       55 %  LV EF Reported:                   55 %  Global Longitudinal Strain (GLS): -17.00       LV DIASTOLIC FUNCTION:             Normal Ranges:  MV Peak E:             0.65 m/s    (0.7-1.2 m/s)  MV Peak A:             0.62 m/s    (0.42-0.7 m/s)  E/A Ratio:             1.05        (1.0-2.2)  MV e'                  0.120 m/s   (>8.0)  MV lateral e'          0.15 m/s  MV medial e'           0.09 m/s  MV A Dur:              123.69 msec  E/e' Ratio:            5.43        (<8.0)  a'                     0.07 m/s  PulmV Sys Obi:         37.56 cm/s  PulmV Hatfield Obi:        27.48 cm/s  PulmV S/D Obi:         1.37  PulmV A Revs Obi:      14.89 cm/s  PulmV A Revs Dur:      81.83 msec       MITRAL VALVE:          Normal Ranges:  MV DT:        262 msec (150-240msec)       AORTIC VALVE:                     Normal Ranges:  AoV Vmax:                1.40 m/s (<=1.7m/s)  AoV Peak P.8 mmHg (<20mmHg)  AoV Mean P.5 mmHg (1.7-11.5mmHg)  LVOT Max Obi:            1.14 m/s (<=1.1m/s)  AoV VTI:                 22.54 cm (18-25cm)  LVOT VTI:                18.74 cm  LVOT Diameter:           2.03 cm  (1.8-2.4cm)  AoV Area, VTI:           2.68 cm2 (2.5-5.5cm2)  AoV Area,Vmax:           2.62 cm2 (2.5-4.5cm2)  AoV Dimensionless Index: 0.83       RIGHT VENTRICLE:  RV Basal 3.20 cm  RV Mid   2.30 cm  RV Major 8.1 cm  TAPSE:   22.0 mm  RV s'     0.18 m/s       TRICUSPID VALVE/RVSP:         Normal Ranges:  Est. RA Pressure:     3  IVC Diam:             1.27 cm       PULMONIC VALVE:          Normal Ranges:  PV Accel Time:  86 msec  (>120ms)  PV Max Obi:     1.2 m/s  (0.6-0.9m/s)  PV Max P.4 mmHg       PULMONARY VEINS:  PulmV A Revs Dur: 81.83 msec  PulmV A Revs Obi: 14.89 cm/s  PulmV Hatfield Obi:   27.48 cm/s  PulmV S/D Obi:    1.37  PulmV Sys Obi:    37.56 cm/s       AORTA:  Asc Ao Diam 3.00 cm       [id number] Manuel Mckeon MD  Electronically signed on 2025 at 9:31:16 AM       ** Final **  Electrocardiogram, 12-lead PRN ACS symptoms  Normal sinus rhythm  Possible Left atrial enlargement  Nonspecific ST and T wave abnormality  Abnormal ECG  When compared with ECG of 2025 09:53, (unconfirmed)  No significant change was found      Physical Exam  Constitutional: Well-developed, alert active, cooperative not in acute distress  Eyes: PERRLA, clear sclera  ENMT: Moist mucosal membranes, no exudate  Head / Neck: Atraumatic, normocephalic, supple neck, JVP not visualized  Lungs: Patent airways, CTABL  Heart: RRR, S1S2, no murmurs appreciated, palpable pulses in all extremities  GI: Soft, NT, ND, bowel sounds present in all quadrants  MSK: Moves all extremities freely, no restriction  of ROM, no joint edema  Extremities: Intact x 4, no peripheral edema  : No Seals catheter inserted  Breast: Deferred  Neurological: AAO x 3 to person, place and date, facial muscles symmetrical, sensation intact, strength 4/4, no acute focal neurological deficits appreciated  Psychological: Appropriate mood and behavior  Relevant Results             Scheduled medications  Scheduled Medications[1]  Continuous medications  Continuous Medications[2]  PRN medications  PRN Medications[3]         Assessment & Plan  Pneumonia of right middle lobe due to infectious organism  Sepsis   Hypokalemia  Hx of cardiac thrombus  Hx of positive lupus anticoagulant   Hx of endocarditis      30 YOF PMH lupus anticoagulant, cardiac thrombus, endocarditis, not on blood thinners currently, presenting with sore throat and shortness of breath. Symptoms began 4 days ago with sore throat and sinus congestion        Sepsis without endorgan damage  - Left shift leukocytosis: Neutrophilia with bandemia  - CTA negative for pulmonary embolism but shows pneumonia  - Received ceftriaxone and doxycycline in the ED  - Unasyn 3 g IV piggyback every 8 hours  - Blood cultures: Positive for haemophilus influenza, sensitivity and susceptibility pending  - Mildly elevated procalcitonin level  - Urine antigen for Legionella and Streptococcus pending  - Incentive spirometer  -Dextromethorphan guaifenesin as needed cough  - Albuterol nebulizer every 4 hours as needed shortness of breath     Generalized fatigue  - PT OT: Appreciate evaluation recommendations     Hypokalemia  - Replenished in the ED, a.m. labs shows K 3.2  -Replenished with 20 mEq of KCl x 1  - Continue renal function monitoring     History of cardiac thrombus/endocarditis: Not on any anticoagulation  - Echocardiogram     Recent SI, status post hospitalization at [facility]  - Per  note, patient admits to contemplating suicide without plan  - Psychiatry consulted     Diet  - Regular     DVT prophylaxis: Presented with shortness of breath, weakness, found with pneumonia, need further management, discharge pending sensitivity and susceptibility of haemophilus influenza.         Gabino Villalobos DO           [1] ampicillin-sulbactam, 3 g, intravenous, q6h  enoxaparin, 40 mg, subcutaneous, q12h ASHLY  escitalopram, 10 mg, oral, Daily  perflutren lipid microspheres, 0.5-10 mL of dilution, intravenous, Once in imaging  polyethylene glycol, 17 g, oral, Daily  [2]    [3] PRN medications: albuterol, benzonatate, dextromethorphan-guaifenesin, ondansetron, oxyCODONE, oxyCODONE, phenoL

## 2025-06-29 NOTE — CARE PLAN
The clinical goals for the shift include monitor pain during shift    Over the shift, the patient did make progress toward the following goals. Barriers to progression include      Problem: Pain - Adult  Goal: Verbalizes/displays adequate comfort level or baseline comfort level  Outcome: Progressing  Flowsheets (Taken 6/29/2025 1503)  Verbalizes/displays adequate comfort level or baseline comfort level:   Encourage patient to monitor pain and request assistance   Assess pain using appropriate pain scale   Administer analgesics based on type and severity of pain and evaluate response   Implement non-pharmacological measures as appropriate and evaluate response   Consider cultural and social influences on pain and pain management   Notify Licensed Independent Practitioner if interventions unsuccessful or patient reports new pain     Problem: Safety - Adult  Goal: Free from fall injury  Outcome: Progressing  Flowsheets (Taken 6/29/2025 1503)  Free from fall injury: Instruct family/caregiver on patient safety     Problem: Discharge Planning  Goal: Discharge to home or other facility with appropriate resources  Outcome: Progressing  Flowsheets (Taken 6/29/2025 1503)  Discharge to home or other facility with appropriate resources:   Identify barriers to discharge with patient and caregiver   Arrange for needed discharge resources and transportation as appropriate   Identify discharge learning needs (meds, wound care, etc)   Arrange for interpreters to assist at discharge as needed   Refer to discharge planning if patient needs post-hospital services based on physician order or complex needs related to functional status, cognitive ability or social support system     Problem: Chronic Conditions and Co-morbidities  Goal: Patient's chronic conditions and co-morbidity symptoms are monitored and maintained or improved  Outcome: Progressing  Flowsheets (Taken 6/29/2025 1503)  Care Plan - Patient's Chronic Conditions and  Co-Morbidity Symptoms are Monitored and Maintained or Improved:   Monitor and assess patient's chronic conditions and comorbid symptoms for stability, deterioration, or improvement   Collaborate with multidisciplinary team to address chronic and comorbid conditions and prevent exacerbation or deterioration   Update acute care plan with appropriate goals if chronic or comorbid symptoms are exacerbated and prevent overall improvement and discharge     Problem: Nutrition  Goal: Nutrient intake appropriate for maintaining nutritional needs  Outcome: Progressing

## 2025-06-29 NOTE — CARE PLAN
The patient's goals for the shift include      The clinical goals for the shift include safety during shift      Problem: Safety - Adult  Goal: Free from fall injury  Outcome: Progressing

## 2025-06-30 LAB
ALBUMIN SERPL BCP-MCNC: 3 G/DL (ref 3.4–5)
ANION GAP SERPL CALC-SCNC: 17 MMOL/L (ref 10–20)
B-LACTAMASE ORGANISM ISLT: NEGATIVE
B-LACTAMASE ORGANISM ISLT: NEGATIVE
BACTERIA BLD AEROBE CULT: ABNORMAL
BACTERIA BLD AEROBE CULT: ABNORMAL
BACTERIA BLD CULT: ABNORMAL
BACTERIA BLD CULT: ABNORMAL
BUN SERPL-MCNC: 4 MG/DL (ref 6–23)
CALCIUM SERPL-MCNC: 8.3 MG/DL (ref 8.6–10.3)
CHLORIDE SERPL-SCNC: 98 MMOL/L (ref 98–107)
CO2 SERPL-SCNC: 25 MMOL/L (ref 21–32)
CREAT SERPL-MCNC: 0.52 MG/DL (ref 0.5–1.05)
EGFRCR SERPLBLD CKD-EPI 2021: >90 ML/MIN/1.73M*2
ERYTHROCYTE [DISTWIDTH] IN BLOOD BY AUTOMATED COUNT: 14.1 % (ref 11.5–14.5)
GLUCOSE SERPL-MCNC: 79 MG/DL (ref 74–99)
GRAM STN SPEC: ABNORMAL
GRAM STN SPEC: ABNORMAL
HCT VFR BLD AUTO: 33.3 % (ref 36–46)
HGB BLD-MCNC: 11.4 G/DL (ref 12–16)
MAGNESIUM SERPL-MCNC: 2.08 MG/DL (ref 1.6–2.4)
MCH RBC QN AUTO: 30 PG (ref 26–34)
MCHC RBC AUTO-ENTMCNC: 34.2 G/DL (ref 32–36)
MCV RBC AUTO: 88 FL (ref 80–100)
NRBC BLD-RTO: 0 /100 WBCS (ref 0–0)
PHOSPHATE SERPL-MCNC: 3.9 MG/DL (ref 2.5–4.9)
PLATELET # BLD AUTO: 385 X10*3/UL (ref 150–450)
POTASSIUM SERPL-SCNC: 3.6 MMOL/L (ref 3.5–5.3)
RBC # BLD AUTO: 3.8 X10*6/UL (ref 4–5.2)
SODIUM SERPL-SCNC: 136 MMOL/L (ref 136–145)
WBC # BLD AUTO: 14.3 X10*3/UL (ref 4.4–11.3)

## 2025-06-30 PROCEDURE — 36415 COLL VENOUS BLD VENIPUNCTURE: CPT

## 2025-06-30 PROCEDURE — 2500000001 HC RX 250 WO HCPCS SELF ADMINISTERED DRUGS (ALT 637 FOR MEDICARE OP): Performed by: INTERNAL MEDICINE

## 2025-06-30 PROCEDURE — 2500000001 HC RX 250 WO HCPCS SELF ADMINISTERED DRUGS (ALT 637 FOR MEDICARE OP): Performed by: PSYCHIATRY & NEUROLOGY

## 2025-06-30 PROCEDURE — 80069 RENAL FUNCTION PANEL: CPT | Performed by: INTERNAL MEDICINE

## 2025-06-30 PROCEDURE — 83735 ASSAY OF MAGNESIUM: CPT

## 2025-06-30 PROCEDURE — 1200000002 HC GENERAL ROOM WITH TELEMETRY DAILY

## 2025-06-30 PROCEDURE — 99232 SBSQ HOSP IP/OBS MODERATE 35: CPT | Performed by: INTERNAL MEDICINE

## 2025-06-30 PROCEDURE — 85027 COMPLETE CBC AUTOMATED: CPT

## 2025-06-30 PROCEDURE — 2500000004 HC RX 250 GENERAL PHARMACY W/ HCPCS (ALT 636 FOR OP/ED): Mod: JZ

## 2025-06-30 RX ADMIN — OXYCODONE HYDROCHLORIDE 10 MG: 5 TABLET ORAL at 22:57

## 2025-06-30 RX ADMIN — ENOXAPARIN SODIUM 40 MG: 40 INJECTION SUBCUTANEOUS at 22:20

## 2025-06-30 RX ADMIN — OXYCODONE HYDROCHLORIDE 10 MG: 5 TABLET ORAL at 01:07

## 2025-06-30 RX ADMIN — AMPICILLIN SODIUM AND SULBACTAM SODIUM 3 G: 2; 1 INJECTION, POWDER, FOR SOLUTION INTRAMUSCULAR; INTRAVENOUS at 16:20

## 2025-06-30 RX ADMIN — ESCITALOPRAM OXALATE 10 MG: 10 TABLET ORAL at 09:25

## 2025-06-30 RX ADMIN — AMPICILLIN SODIUM AND SULBACTAM SODIUM 3 G: 2; 1 INJECTION, POWDER, FOR SOLUTION INTRAMUSCULAR; INTRAVENOUS at 09:36

## 2025-06-30 RX ADMIN — AMPICILLIN SODIUM AND SULBACTAM SODIUM 3 G: 2; 1 INJECTION, POWDER, FOR SOLUTION INTRAMUSCULAR; INTRAVENOUS at 05:00

## 2025-06-30 RX ADMIN — ENOXAPARIN SODIUM 40 MG: 40 INJECTION SUBCUTANEOUS at 09:25

## 2025-06-30 RX ADMIN — OXYCODONE HYDROCHLORIDE 10 MG: 5 TABLET ORAL at 11:45

## 2025-06-30 ASSESSMENT — COGNITIVE AND FUNCTIONAL STATUS - GENERAL
MOBILITY SCORE: 24
DAILY ACTIVITIY SCORE: 24
MOBILITY SCORE: 24
DAILY ACTIVITIY SCORE: 24

## 2025-06-30 ASSESSMENT — PAIN DESCRIPTION - LOCATION
LOCATION: THROAT
LOCATION: CHEST

## 2025-06-30 ASSESSMENT — PAIN DESCRIPTION - DESCRIPTORS
DESCRIPTORS: TIGHTNESS
DESCRIPTORS: ACHING;THROBBING

## 2025-06-30 ASSESSMENT — PAIN SCALES - GENERAL
PAINLEVEL_OUTOF10: 3
PAINLEVEL_OUTOF10: 3
PAINLEVEL_OUTOF10: 10 - WORST POSSIBLE PAIN
PAINLEVEL_OUTOF10: 8
PAINLEVEL_OUTOF10: 3
PAINLEVEL_OUTOF10: 7
PAINLEVEL_OUTOF10: 7
PAINLEVEL_OUTOF10: 5 - MODERATE PAIN

## 2025-06-30 ASSESSMENT — PAIN - FUNCTIONAL ASSESSMENT
PAIN_FUNCTIONAL_ASSESSMENT: 0-10

## 2025-06-30 NOTE — PROGRESS NOTES
Shawn Tomlinson is a 30 y.o. female on day 5 of admission presenting with Pneumonia of right middle lobe due to infectious organism.      Subjective   Patient was seen and examined bedside this morning, stated that she had an uneventful night, denies having any symptoms at that time.       Objective     Last Recorded Vitals  /81 (BP Location: Right arm, Patient Position: Lying)   Pulse 99   Temp 36.6 °C (97.9 °F) (Temporal)   Resp 16   Wt 145 kg (319 lb)   SpO2 92%   Intake/Output last 3 Shifts:    Intake/Output Summary (Last 24 hours) at 6/30/2025 1916  Last data filed at 6/30/2025 1620  Gross per 24 hour   Intake 1240 ml   Output --   Net 1240 ml       Admission Weight  Weight: 145 kg (319 lb) (06/25/25 0947)    Daily Weight  06/25/25 : 145 kg (319 lb)    Image Results  Transthoracic Echo Complete     [facility], [address] [place] [address]               Tel [phone] and Fax [phone]    TRANSTHORACIC ECHOCARDIOGRAM REPORT       Patient Name:       SHAWN TOMLINSON     Reading Physician:    [id number] Manuel Mckeon MD  Study Date:         6/27/2025           Ordering Provider:    [id number] CHINTAN ERAZO  MRN/PID:            [id number]            Fellow:  Accession#:         [id number]        Nurse:  Date of Birth/Age:  1995 / 30 years Sonographer:          Meryl Austin RDCS  Gender assigned at  F                   Additional Staff:  Birth:  Height:             180.34 cm           Admit Date:           6/23/2025  Weight:             144.70 kg           Admission Status:     Inpatient -                                                                Priority                                                                discharge  BSA / BMI:          2.57 m2 / 44.49     Encounter#:           [id number]                      kg/m2  Blood Pressure:      112/70 mmHg         Department Location:  [facility] Non                                                                Invasive    Study Type:    TRANSTHORACIC ECHO (TTE) COMPLETE  Diagnosis/ICD: Intracardiac thrombosis, not elsewhere classified-I51.3  Indication:    Intracardiac thrombus, lupus  CPT Code:      Echo Complete w Full Doppler-[id number]    Patient History:  Smoker:            Current.  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    Study Detail: The following Echo studies were performed: 2D, M-Mode, Doppler and                color flow. Technically challenging study due to body habitus.                Optison used as a contrast agent for endocardial border                definition. Total contrast used for this procedure was 2 mL via IV                push.       PHYSICIAN INTERPRETATION:  Left Ventricle: Left ventricular ejection fraction is low normal calculated by Calderon's biplane at 55%. There are no regional left ventricular wall motion abnormalities. The left ventricular cavity size is normal. There is normal septal and normal posterior left ventricular wall thickness. Left Ventricular Global Longitudinal Strain - 17.0 %. Spectral Doppler shows a normal pattern of left ventricular diastolic filling. Strain values are normal, which imply normal myocardial function. There is no definite left ventricular thrombus visualized.  Left Atrium: The left atrial size is normal.  Right Ventricle: The right ventricle is normal in size. There is normal right ventricular global systolic function.  Right Atrium: The right atrium is normal in size.  Aortic Valve: The aortic valve is trileaflet. The aortic valve area by VTI is 2.68 cmï¿½ with a peak velocity of 1.40 m/s. The peak and mean gradients are 8 mmHg and 5 mmHg, respectively with a dimensionless index of 0.83. There is no evidence of aortic valve regurgitation.  Mitral Valve: The mitral valve is normal in structure. There is no  evidence of mitral valve regurgitation. The E Vmax is 0.65 m/s.  Tricuspid Valve: The tricuspid valve is structurally normal. There is trace tricuspid regurgitation.  Pulmonic Valve: The pulmonic valve is structurally normal. There is trace pulmonic valve regurgitation.  Pericardium: Trivial pericardial effusion.  Aorta: The aortic root is normal.  Systemic Veins: The inferior vena cava appears normal in size, with IVC inspiratory collapse greater than 50%.  In comparison to the previous echocardiogram(s): Compared with study dated 8/23/2023, no significant change in particular, no thrombus was seen in the RVOT views.       CONCLUSIONS:   1. Left ventricular ejection fraction is low normal calculated by Calderon's biplane at 55%.   2. Left Ventricular Global Longitudinal Strain - 17.0 %.   3. No left ventricular thrombus visualized.   4. There is normal right ventricular global systolic function.   5. Compared with study dated 8/23/2023, no significant change in particular, no thrombus was seen in the RVOT views.    QUANTITATIVE DATA SUMMARY:     2D MEASUREMENTS:          Normal Ranges:  LAs:             3.81 cm  (2.7-4.0cm)  IVSd:            0.82 cm  (0.6-1.1cm)  LVPWd:           0.91 cm  (0.6-1.1cm)  LVIDd:           5.59 cm  (3.9-5.9cm)  LVIDs:           4.16 cm  LV Mass Index:   71 g/m2  LVEDV Index:     37 ml/m2  LV % FS          25.5 %       LEFT ATRIUM:                  Normal Ranges:  LA Vol A4C:        43.1 ml    (22+/-6mL/m2)  LA Vol A2C:        32.7 ml  LA Vol BP:         39.7 ml  LA Vol Index A4C:  16.8 ml/m2  LA Vol Index A2C:  12.7 ml/m2  LA Vol Index BP:   15.4 ml/m2  LA Area A4C:       17.0 cm2  LA Area A2C:       14.0 cm2  LA Major Axis A4C: 5.7 cm  LA Major Axis A2C: 5.1 cm  LA Volume Index:   15.5 ml/m2  LA Vol A4C:        39.1 ml  LA Vol A2C:        30.9 ml  LA Vol Index BSA:  13.6 ml/m2       RIGHT ATRIUM:                 Normal Ranges:  RA Vol A4C:        40.2 ml    (8.3-19.5ml)  RA Vol Index  A4C:  15.6 ml/m2  RA Area A4C:       14.9 cm2  RA Major Axis A4C: 4.7 cm       AORTA MEASUREMENTS:         Normal Ranges:  Ao Sinus, d:        3.10 cm (2.1-3.5cm)  Ao STJ, d:          2.60 cm (1.7-3.4cm)  Asc Ao, d:          3.00 cm (2.1-3.4cm)       LV SYSTOLIC FUNCTION:                                           Normal Ranges:  EF-A4C View:                      58 %   (>=55%)  EF-A2C View:                      54 %  EF-Biplane:                       55 %  LV EF Reported:                   55 %  Global Longitudinal Strain (GLS): -17.00       LV DIASTOLIC FUNCTION:             Normal Ranges:  MV Peak E:             0.65 m/s    (0.7-1.2 m/s)  MV Peak A:             0.62 m/s    (0.42-0.7 m/s)  E/A Ratio:             1.05        (1.0-2.2)  MV e'                  0.120 m/s   (>8.0)  MV lateral e'          0.15 m/s  MV medial e'           0.09 m/s  MV A Dur:              123.69 msec  E/e' Ratio:            5.43        (<8.0)  a'                     0.07 m/s  PulmV Sys Obi:         37.56 cm/s  PulmV [place] Obi:        27.48 cm/s  PulmV S/D Obi:         1.37  PulmV A Revs Obi:      14.89 cm/s  PulmV A Revs Dur:      81.83 msec       MITRAL VALVE:          Normal Ranges:  MV DT:        262 msec (150-240msec)       AORTIC VALVE:                     Normal Ranges:  AoV Vmax:                1.40 m/s (<=1.7m/s)  AoV Peak P.8 mmHg (<20mmHg)  AoV Mean P.5 mmHg (1.7-11.5mmHg)  LVOT Max Obi:            1.14 m/s (<=1.1m/s)  AoV VTI:                 22.54 cm (18-25cm)  LVOT VTI:                18.74 cm  LVOT Diameter:           2.03 cm  (1.8-2.4cm)  AoV Area, VTI:           2.68 cm2 (2.5-5.5cm2)  AoV Area,Vmax:           2.62 cm2 (2.5-4.5cm2)  AoV Dimensionless Index: 0.83       RIGHT VENTRICLE:  RV Basal 3.20 cm  RV Mid   2.30 cm  RV Major 8.1 cm  TAPSE:   22.0 mm  RV s'    0.18 m/s       TRICUSPID VALVE/RVSP:         Normal Ranges:  Est. RA Pressure:     3  IVC Diam:             1.27 cm       PULMONIC  VALVE:          Normal Ranges:  PV Accel Time:  86 msec  (>120ms)  PV Max Boi:     1.2 m/s  (0.6-0.9m/s)  PV Max P.4 mmHg       PULMONARY VEINS:  PulmV A Revs Dur: 81.83 msec  PulmV A Revs Obi: 14.89 cm/s  PulmV [place] Obi:   27.48 cm/s  PulmV S/D Obi:    1.37  PulmV Sys Obi:    37.56 cm/s       AORTA:  Asc Ao Diam 3.00 cm       [id number] Manuel Mckeon MD  Electronically signed on 2025 at 9:31:16 AM       ** Final **  Electrocardiogram, 12-lead PRN ACS symptoms  Normal sinus rhythm  Possible Left atrial enlargement  Nonspecific ST and T wave abnormality  Abnormal ECG  When compared with ECG of 2025 09:53, (unconfirmed)  No significant change was found      Physical Exam  Constitutional: Well-developed, alert active, cooperative not in acute distress  Eyes: PERRLA, clear sclera  ENMT: Moist mucosal membranes, no exudate  Head / Neck: Atraumatic, normocephalic, supple neck, JVP not visualized  Lungs: Patent airways, CTABL  Heart: RRR, S1S2, no murmurs appreciated, palpable pulses in all extremities  GI: Soft, NT, ND, bowel sounds present in all quadrants  MSK: Moves all extremities freely, no restriction  of ROM, no joint edema  Extremities: Intact x 4, no peripheral edema  : No Seals catheter inserted  Breast: Deferred  Neurological: AAO x 3 to person, place and date, facial muscles symmetrical, sensation intact, strength 4/4, no acute focal neurological deficits appreciated  Psychological: Appropriate mood and behavior  Relevant Results                    Scheduled medications  Scheduled Medications[1]  Continuous medications  Continuous Medications[2]  PRN medications  PRN Medications[3]    Assessment & Plan  Pneumonia of right middle lobe due to infectious organism  Sepsis   Hypokalemia  Hx of cardiac thrombus  Hx of positive lupus anticoagulant   Hx of endocarditis        30 YOF PMH lupus anticoagulant, cardiac thrombus, endocarditis, not on blood thinners currently, presenting with sore  throat and shortness of breath. Symptoms began 4 days ago with sore throat and sinus congestion        Sepsis without endorgan damage  - Left shift leukocytosis: Neutrophilia with bandemia  - CTA negative for pulmonary embolism but shows pneumonia  - Received ceftriaxone and doxycycline in the ED  - Unasyn 3 g IV piggyback every 8 hours  - Blood cultures: Positive for haemophilus influenza, sensitivity and susceptibility pending  -Repeat blood cultures result pending  - Mildly elevated procalcitonin level  - Urine antigen for Legionella and Streptococcus pending  - Incentive spirometer  -Dextromethorphan guaifenesin as needed cough  - Albuterol nebulizer every 4 hours as needed shortness of breath  - Will consult ID for discharge planning     Generalized fatigue  - PT OT: Appreciate evaluation recommendations     Hypokalemia  - Replenished in the ED, a.m. labs shows K 3.2  -Replenished with 20 mEq of KCl x 1  - Continue renal function monitoring     History of cardiac thrombus/endocarditis: Not on any anticoagulation  - Echocardiogram     Recent SI, status post hospitalization at [facility]  - Per  note, patient admits to contemplating suicide without plan  - Psychiatry consulted     Diet  - Regular     DVT prophylaxis: Presented with shortness of breath, weakness, found with pneumonia, need further management, discharge pending haemophilus susceptibility result.     Possible discharge tomorrow morning      Gabino Villalobos DO           [1] ampicillin-sulbactam, 3 g, intravenous, q6h  enoxaparin, 40 mg, subcutaneous, q12h ASHLY  escitalopram, 10 mg, oral, Daily  perflutren lipid microspheres, 0.5-10 mL of dilution, intravenous, Once in imaging  polyethylene glycol, 17 g, oral, Daily  [2]    [3] PRN medications: albuterol, benzonatate, dextromethorphan-guaifenesin, ondansetron, oxyCODONE, oxyCODONE, phenoL

## 2025-06-30 NOTE — CARE PLAN
The clinical goals for the shift include PAIN CONTROL DURING SHIFT    Over the shift, the patient did make progress toward the following goals. Barriers to progression include         Problem: Pain - Adult  Goal: Verbalizes/displays adequate comfort level or baseline comfort level  Outcome: Progressing  Flowsheets (Taken 6/30/2025 1402)  Verbalizes/displays adequate comfort level or baseline comfort level:   Encourage patient to monitor pain and request assistance   Assess pain using appropriate pain scale   Administer analgesics based on type and severity of pain and evaluate response   Implement non-pharmacological measures as appropriate and evaluate response   Consider cultural and social influences on pain and pain management   Notify Licensed Independent Practitioner if interventions unsuccessful or patient reports new pain     Problem: Safety - Adult  Goal: Free from fall injury  Outcome: Progressing  Flowsheets (Taken 6/30/2025 1402)  Free from fall injury: Instruct family/caregiver on patient safety     Problem: Discharge Planning  Goal: Discharge to home or other facility with appropriate resources  Outcome: Progressing  Flowsheets (Taken 6/30/2025 1402)  Discharge to home or other facility with appropriate resources:   Identify barriers to discharge with patient and caregiver   Arrange for needed discharge resources and transportation as appropriate   Identify discharge learning needs (meds, wound care, etc)   Arrange for interpreters to assist at discharge as needed   Refer to discharge planning if patient needs post-hospital services based on physician order or complex needs related to functional status, cognitive ability or social support system     Problem: Chronic Conditions and Co-morbidities  Goal: Patient's chronic conditions and co-morbidity symptoms are monitored and maintained or improved  Outcome: Progressing  Flowsheets (Taken 6/30/2025 1402)  Care Plan - Patient's Chronic Conditions and  Co-Morbidity Symptoms are Monitored and Maintained or Improved:   Monitor and assess patient's chronic conditions and comorbid symptoms for stability, deterioration, or improvement   Collaborate with multidisciplinary team to address chronic and comorbid conditions and prevent exacerbation or deterioration   Update acute care plan with appropriate goals if chronic or comorbid symptoms are exacerbated and prevent overall improvement and discharge     Problem: Nutrition  Goal: Nutrient intake appropriate for maintaining nutritional needs  Outcome: Progressing

## 2025-06-30 NOTE — PROGRESS NOTES
Shawn Tomlinson is a 30 y.o. female on day 4 of admission presenting with Pneumonia of right middle lobe due to infectious organism.      Subjective   Patient was seen and examined bedside this morning, informed on pending sensitivity for haemophilus influenza, discussed lab results showing hypokalemia and need for replenishment for both IV and oral       Objective     Last Recorded Vitals  /69 (BP Location: Left arm, Patient Position: Lying)   Pulse 90   Temp 36.6 °C (97.9 °F) (Temporal)   Resp 18   Wt 145 kg (319 lb)   SpO2 93%   Intake/Output last 3 Shifts:    Intake/Output Summary (Last 24 hours) at 6/29/2025 2051  Last data filed at 6/29/2025 1633  Gross per 24 hour   Intake 1800 ml   Output --   Net 1800 ml       Admission Weight  Weight: 145 kg (319 lb) (06/25/25 0947)    Daily Weight  06/25/25 : 145 kg (319 lb)    Image Results  Transthoracic Echo Complete     [facility], [address] [place] [address]               Tel [phone] and Fax [phone]    TRANSTHORACIC ECHOCARDIOGRAM REPORT       Patient Name:       SHAWN TOMLINSON     Reading Physician:    [id number] Manuel Mckeon MD  Study Date:         6/27/2025           Ordering Provider:    [id number] CHINTAN ERAZO  MRN/PID:            [id number]            Fellow:  Accession#:         [id number]        Nurse:  Date of Birth/Age:  1995 / 30 years Sonographer:          Meryl Austin RDCS  Gender assigned at  F                   Additional Staff:  Birth:  Height:             180.34 cm           Admit Date:           6/23/2025  Weight:             144.70 kg           Admission Status:     Inpatient -                                                                Priority                                                                discharge  BSA / BMI:          2.57 m2 / 44.49     Encounter#:            [id number]                      kg/m2  Blood Pressure:     112/70 mmHg         Department Location:  [facility] Non                                                                Invasive    Study Type:    TRANSTHORACIC ECHO (TTE) COMPLETE  Diagnosis/ICD: Intracardiac thrombosis, not elsewhere classified-I51.3  Indication:    Intracardiac thrombus, lupus  CPT Code:      Echo Complete w Full Doppler-03663    Patient History:  Smoker:            Current.  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    Study Detail: The following Echo studies were performed: 2D, M-Mode, Doppler and                color flow. Technically challenging study due to body habitus.                Optison used as a contrast agent for endocardial border                definition. Total contrast used for this procedure was 2 mL via IV                push.       PHYSICIAN INTERPRETATION:  Left Ventricle: Left ventricular ejection fraction is low normal calculated by Calderon's biplane at 55%. There are no regional left ventricular wall motion abnormalities. The left ventricular cavity size is normal. There is normal septal and normal posterior left ventricular wall thickness. Left Ventricular Global Longitudinal Strain - 17.0 %. Spectral Doppler shows a normal pattern of left ventricular diastolic filling. Strain values are normal, which imply normal myocardial function. There is no definite left ventricular thrombus visualized.  Left Atrium: The left atrial size is normal.  Right Ventricle: The right ventricle is normal in size. There is normal right ventricular global systolic function.  Right Atrium: The right atrium is normal in size.  Aortic Valve: The aortic valve is trileaflet. The aortic valve area by VTI is 2.68 cmï¿½ with a peak velocity of 1.40 m/s. The peak and mean gradients are 8 mmHg and 5 mmHg, respectively with a dimensionless index of 0.83. There is no evidence of aortic valve regurgitation.  Mitral  Valve: The mitral valve is normal in structure. There is no evidence of mitral valve regurgitation. The E Vmax is 0.65 m/s.  Tricuspid Valve: The tricuspid valve is structurally normal. There is trace tricuspid regurgitation.  Pulmonic Valve: The pulmonic valve is structurally normal. There is trace pulmonic valve regurgitation.  Pericardium: Trivial pericardial effusion.  Aorta: The aortic root is normal.  Systemic Veins: The inferior vena cava appears normal in size, with IVC inspiratory collapse greater than 50%.  In comparison to the previous echocardiogram(s): Compared with study dated 8/23/2023, no significant change in particular, no thrombus was seen in the RVOT views.       CONCLUSIONS:   1. Left ventricular ejection fraction is low normal calculated by Calderon's biplane at 55%.   2. Left Ventricular Global Longitudinal Strain - 17.0 %.   3. No left ventricular thrombus visualized.   4. There is normal right ventricular global systolic function.   5. Compared with study dated 8/23/2023, no significant change in particular, no thrombus was seen in the RVOT views.    QUANTITATIVE DATA SUMMARY:     2D MEASUREMENTS:          Normal Ranges:  LAs:             3.81 cm  (2.7-4.0cm)  IVSd:            0.82 cm  (0.6-1.1cm)  LVPWd:           0.91 cm  (0.6-1.1cm)  LVIDd:           5.59 cm  (3.9-5.9cm)  LVIDs:           4.16 cm  LV Mass Index:   71 g/m2  LVEDV Index:     37 ml/m2  LV % FS          25.5 %       LEFT ATRIUM:                  Normal Ranges:  LA Vol A4C:        43.1 ml    (22+/-6mL/m2)  LA Vol A2C:        32.7 ml  LA Vol BP:         39.7 ml  LA Vol Index A4C:  16.8 ml/m2  LA Vol Index A2C:  12.7 ml/m2  LA Vol Index BP:   15.4 ml/m2  LA Area A4C:       17.0 cm2  LA Area A2C:       14.0 cm2  LA Major Axis A4C: 5.7 cm  LA Major Axis A2C: 5.1 cm  LA Volume Index:   15.5 ml/m2  LA Vol A4C:        39.1 ml  LA Vol A2C:        30.9 ml  LA Vol Index BSA:  13.6 ml/m2       RIGHT ATRIUM:                 Normal  Ranges:  RA Vol A4C:        40.2 ml    (8.3-19.5ml)  RA Vol Index A4C:  15.6 ml/m2  RA Area A4C:       14.9 cm2  RA Major Axis A4C: 4.7 cm       AORTA MEASUREMENTS:         Normal Ranges:  Ao Sinus, d:        3.10 cm (2.1-3.5cm)  Ao STJ, d:          2.60 cm (1.7-3.4cm)  Asc Ao, d:          3.00 cm (2.1-3.4cm)       LV SYSTOLIC FUNCTION:                                           Normal Ranges:  EF-A4C View:                      58 %   (>=55%)  EF-A2C View:                      54 %  EF-Biplane:                       55 %  LV EF Reported:                   55 %  Global Longitudinal Strain (GLS): -17.00       LV DIASTOLIC FUNCTION:             Normal Ranges:  MV Peak E:             0.65 m/s    (0.7-1.2 m/s)  MV Peak A:             0.62 m/s    (0.42-0.7 m/s)  E/A Ratio:             1.05        (1.0-2.2)  MV e'                  0.120 m/s   (>8.0)  MV lateral e'          0.15 m/s  MV medial e'           0.09 m/s  MV A Dur:              123.69 msec  E/e' Ratio:            5.43        (<8.0)  a'                     0.07 m/s  PulmV Sys Obi:         37.56 cm/s  PulmV Hatfield Obi:        27.48 cm/s  PulmV S/D Obi:         1.37  PulmV A Revs Obi:      14.89 cm/s  PulmV A Revs Dur:      81.83 msec       MITRAL VALVE:          Normal Ranges:  MV DT:        262 msec (150-240msec)       AORTIC VALVE:                     Normal Ranges:  AoV Vmax:                1.40 m/s (<=1.7m/s)  AoV Peak P.8 mmHg (<20mmHg)  AoV Mean P.5 mmHg (1.7-11.5mmHg)  LVOT Max Obi:            1.14 m/s (<=1.1m/s)  AoV VTI:                 22.54 cm (18-25cm)  LVOT VTI:                18.74 cm  LVOT Diameter:           2.03 cm  (1.8-2.4cm)  AoV Area, VTI:           2.68 cm2 (2.5-5.5cm2)  AoV Area,Vmax:           2.62 cm2 (2.5-4.5cm2)  AoV Dimensionless Index: 0.83       RIGHT VENTRICLE:  RV Basal 3.20 cm  RV Mid   2.30 cm  RV Major 8.1 cm  TAPSE:   22.0 mm  RV s'    0.18 m/s       TRICUSPID VALVE/RVSP:         Normal Ranges:  Est.  RA Pressure:     3  IVC Diam:             1.27 cm       PULMONIC VALVE:          Normal Ranges:  PV Accel Time:  86 msec  (>120ms)  PV Max Obi:     1.2 m/s  (0.6-0.9m/s)  PV Max P.4 mmHg       PULMONARY VEINS:  PulmV A Revs Dur: 81.83 msec  PulmV A Revs Obi: 14.89 cm/s  PulmV Hatfield Obi:   27.48 cm/s  PulmV S/D Obi:    1.37  PulmV Sys Obi:    37.56 cm/s       AORTA:  Asc Ao Diam 3.00 cm       [id number] Manuel cMkeon MD  Electronically signed on 2025 at 9:31:16 AM       ** Final **  Electrocardiogram, 12-lead PRN ACS symptoms  Normal sinus rhythm  Possible Left atrial enlargement  Nonspecific ST and T wave abnormality  Abnormal ECG  When compared with ECG of 2025 09:53, (unconfirmed)  No significant change was found      Physical Exam  Constitutional: Well-developed, alert active, cooperative not in acute distress  Eyes: PERRLA, clear sclera  ENMT: Moist mucosal membranes, no exudate  Head / Neck: Atraumatic, normocephalic, supple neck, JVP not visualized  Lungs: Patent airways, CTABL  Heart: RRR, S1S2, no murmurs appreciated, palpable pulses in all extremities  GI: Soft, NT, ND, bowel sounds present in all quadrants  MSK: Moves all extremities freely, no restriction  of ROM, no joint edema  Extremities: Intact x 4, no peripheral edema  : No Seals catheter inserted  Breast: Deferred  Neurological: AAO x 3 to person, place and date, facial muscles symmetrical, sensation intact, strength 4/4, no acute focal neurological deficits appreciated  Psychological: Appropriate mood and behavior  Relevant Results             Scheduled medications  Scheduled Medications[1]  Continuous medications  Continuous Medications[2]  PRN medications  PRN Medications[3]           Assessment & Plan  Pneumonia of right middle lobe due to infectious organism  Sepsis   Hypokalemia  Hx of cardiac thrombus  Hx of positive lupus anticoagulant   Hx of endocarditis     30 YOF PMH lupus anticoagulant, cardiac thrombus,  endocarditis, not on blood thinners currently, presenting with sore throat and shortness of breath. Symptoms began 4 days ago with sore throat and sinus congestion        Sepsis without endorgan damage  - Left shift leukocytosis: Neutrophilia with bandemia  - CTA negative for pulmonary embolism but shows pneumonia  - Received ceftriaxone and doxycycline in the ED  - Unasyn 3 g IV piggyback every 8 hours  - Blood cultures: Positive for haemophilus influenza, sensitivity and susceptibility pending  -Repeat blood cultures result pending  - Mildly elevated procalcitonin level  - Urine antigen for Legionella and Streptococcus pending  - Incentive spirometer  -Dextromethorphan guaifenesin as needed cough  - Albuterol nebulizer every 4 hours as needed shortness of breath     Generalized fatigue  - PT OT: Appreciate evaluation recommendations     Hypokalemia  - Replenished in the ED, a.m. labs shows K 3.2  -Replenished with 20 mEq of KCl x 1  - Continue renal function monitoring     History of cardiac thrombus/endocarditis: Not on any anticoagulation  - Echocardiogram     Recent SI, status post hospitalization at [facility]  - Per  note, patient admits to contemplating suicide without plan  - Psychiatry consulted     Diet  - Regular     DVT prophylaxis: Presented with shortness of breath, weakness, found with pneumonia, need further management, discharge pending haemophilus susceptibility result.    Possible discharge tomorrow morning         Gabino Villalobos DO           [1] ampicillin-sulbactam, 3 g, intravenous, q6h  enoxaparin, 40 mg, subcutaneous, q12h ASHLY  escitalopram, 10 mg, oral, Daily  perflutren lipid microspheres, 0.5-10 mL of dilution, intravenous, Once in imaging  polyethylene glycol, 17 g, oral, Daily  [2]    [3] PRN medications: albuterol, benzonatate, dextromethorphan-guaifenesin, ondansetron, oxyCODONE, oxyCODONE, phenoL

## 2025-06-30 NOTE — DOCUMENTATION CLARIFICATION NOTE
"    PATIENT:               SHAWN TOMLINSON  ACCT #:                  [id number]  MRN:                       [id number]  :                       1995  ADMIT DATE:       2025 9:55 AM  DISCH DATE:  RESPONDING PROVIDER #:        [id number]          PROVIDER RESPONSE TEXT:    Sepsis was a differential diagnosis and ruled out after study    CDI QUERY TEXT:    Clarification    Instruction:  Based on your assessment of the patient and the clinical information, please provide the requested documentation by clicking on the appropriate radio button and enter any additional information if prompted.    Question: Sepsis was documented in the medical record. Based on the documentation and the clinical information, can the diagnosis be further clarified as    When answering this query, please exercise your independent professional judgment. The fact that a question is being asked, does not imply that any particular answer is desired or expected.    The patient's clinical indicators include:  Clinical Information: 29 y/o female admitted since , being treated for pneumonia.    Documented Diagnosis:     H&P, Dr. Sanches: \"Admitted for sepsis with acute organ damage, source pneumonia with extensive hx of cardiac thrombus and endocarditis\"    -  Medicine notes, Dr. Villalobos: \"Sepsis without endorgan damage\"      Clinical Indicators:  -VS on admit: T 98.4, , RR 18, /77, 100% on room air.  -WBC:  23.7, 23.2, 12.5, 13.9  -Platelets:  288, 285, 286, 333  -Abs Neutrophil:  22.28  -Lactic acid:  2.2, 1.3  -BUN:  8, 6, 6, 5  -Creat:  0.66, 0.62, 0.56, 0.54  -Bilirubin:  0.8  -Procalcitonin:  0.94  -Microbiology Results:  Blood culture- haemophilus influenzae  -Other clinical indicators: No linked organ dysfunction    Treatment: IV Ceftriaxone x1, IV Doxycycline x1, IV Unasyn - present    Risk Factors: pneumonia  Options provided:  -- Sepsis was a differential diagnosis and ruled out after study  -- Sepsis " with acute organ dysfunction, Please specify sepsis associated organ dysfunction below  -- Bacteremia without sepsis  -- Other - I will add my own diagnosis  -- Refer to Clinical Documentation Reviewer    Query created by: Bev Sainz on 6/28/2025 11:12 AM      Electronically signed by:  FRANTZ HERNDON DO 6/29/2025 10:58 PM

## 2025-06-30 NOTE — CARE PLAN
The patient's goals for the shift include      The clinical goals for the shift include monitor pain during shift    Problem: Safety - Adult  Goal: Free from fall injury  Outcome: Progressing     Problem: Pain - Adult  Goal: Verbalizes/displays adequate comfort level or baseline comfort level  Outcome: Progressing     Problem: Discharge Planning  Goal: Discharge to home or other facility with appropriate resources  Outcome: Progressing     Problem: Chronic Conditions and Co-morbidities  Goal: Patient's chronic conditions and co-morbidity symptoms are monitored and maintained or improved  Outcome: Progressing

## 2025-06-30 NOTE — PROGRESS NOTES
06/30/25 0700   Discharge Planning   Expected Discharge Disposition Home     Poss dc today pend haemophilus susceptibility result.

## 2025-07-01 ENCOUNTER — PHARMACY VISIT (OUTPATIENT)
Dept: PHARMACY | Facility: CLINIC | Age: 30
End: 2025-07-01
Payer: MEDICAID

## 2025-07-01 VITALS
BODY MASS INDEX: 41.02 KG/M2 | HEIGHT: 71 IN | OXYGEN SATURATION: 95 % | HEART RATE: 86 BPM | DIASTOLIC BLOOD PRESSURE: 80 MMHG | WEIGHT: 293 LBS | TEMPERATURE: 97.9 F | SYSTOLIC BLOOD PRESSURE: 128 MMHG | RESPIRATION RATE: 16 BRPM

## 2025-07-01 LAB
ANION GAP SERPL CALC-SCNC: 13 MMOL/L (ref 10–20)
BUN SERPL-MCNC: 4 MG/DL (ref 6–23)
CALCIUM SERPL-MCNC: 8.2 MG/DL (ref 8.6–10.3)
CHLORIDE SERPL-SCNC: 100 MMOL/L (ref 98–107)
CO2 SERPL-SCNC: 27 MMOL/L (ref 21–32)
CREAT SERPL-MCNC: 0.5 MG/DL (ref 0.5–1.05)
EGFRCR SERPLBLD CKD-EPI 2021: >90 ML/MIN/1.73M*2
ERYTHROCYTE [DISTWIDTH] IN BLOOD BY AUTOMATED COUNT: 13.9 % (ref 11.5–14.5)
GLUCOSE SERPL-MCNC: 86 MG/DL (ref 74–99)
HCT VFR BLD AUTO: 32.5 % (ref 36–46)
HGB BLD-MCNC: 10.7 G/DL (ref 12–16)
MAGNESIUM SERPL-MCNC: 2.11 MG/DL (ref 1.6–2.4)
MCH RBC QN AUTO: 28.8 PG (ref 26–34)
MCHC RBC AUTO-ENTMCNC: 32.9 G/DL (ref 32–36)
MCV RBC AUTO: 87 FL (ref 80–100)
NRBC BLD-RTO: 0 /100 WBCS (ref 0–0)
PLATELET # BLD AUTO: 408 X10*3/UL (ref 150–450)
POTASSIUM SERPL-SCNC: 3.6 MMOL/L (ref 3.5–5.3)
RBC # BLD AUTO: 3.72 X10*6/UL (ref 4–5.2)
SODIUM SERPL-SCNC: 136 MMOL/L (ref 136–145)
WBC # BLD AUTO: 12.9 X10*3/UL (ref 4.4–11.3)

## 2025-07-01 PROCEDURE — 82374 ASSAY BLOOD CARBON DIOXIDE: CPT | Performed by: INTERNAL MEDICINE

## 2025-07-01 PROCEDURE — 85027 COMPLETE CBC AUTOMATED: CPT

## 2025-07-01 PROCEDURE — 83735 ASSAY OF MAGNESIUM: CPT

## 2025-07-01 PROCEDURE — 36415 COLL VENOUS BLD VENIPUNCTURE: CPT

## 2025-07-01 PROCEDURE — 2500000004 HC RX 250 GENERAL PHARMACY W/ HCPCS (ALT 636 FOR OP/ED): Mod: JZ

## 2025-07-01 PROCEDURE — RXMED WILLOW AMBULATORY MEDICATION CHARGE

## 2025-07-01 PROCEDURE — 2500000001 HC RX 250 WO HCPCS SELF ADMINISTERED DRUGS (ALT 637 FOR MEDICARE OP): Performed by: PSYCHIATRY & NEUROLOGY

## 2025-07-01 RX ORDER — ESCITALOPRAM OXALATE 10 MG/1
10 TABLET ORAL DAILY
Qty: 30 TABLET | Refills: 0 | Status: SHIPPED | OUTPATIENT
Start: 2025-07-02 | End: 2025-08-01

## 2025-07-01 RX ORDER — BENZONATATE 100 MG/1
100 CAPSULE ORAL 3 TIMES DAILY PRN
Qty: 20 CAPSULE | Refills: 0 | Status: SHIPPED | OUTPATIENT
Start: 2025-07-01

## 2025-07-01 RX ORDER — AMOXICILLIN AND CLAVULANATE POTASSIUM 875; 125 MG/1; MG/1
1 TABLET, FILM COATED ORAL 2 TIMES DAILY
Qty: 16 TABLET | Refills: 0 | Status: SHIPPED | OUTPATIENT
Start: 2025-07-01 | End: 2025-07-09

## 2025-07-01 RX ADMIN — ESCITALOPRAM OXALATE 10 MG: 10 TABLET ORAL at 09:25

## 2025-07-01 RX ADMIN — AMPICILLIN SODIUM AND SULBACTAM SODIUM 3 G: 2; 1 INJECTION, POWDER, FOR SOLUTION INTRAMUSCULAR; INTRAVENOUS at 06:30

## 2025-07-01 RX ADMIN — AMPICILLIN SODIUM AND SULBACTAM SODIUM 3 G: 2; 1 INJECTION, POWDER, FOR SOLUTION INTRAMUSCULAR; INTRAVENOUS at 14:03

## 2025-07-01 RX ADMIN — AMPICILLIN SODIUM AND SULBACTAM SODIUM 3 G: 2; 1 INJECTION, POWDER, FOR SOLUTION INTRAMUSCULAR; INTRAVENOUS at 01:30

## 2025-07-01 ASSESSMENT — COGNITIVE AND FUNCTIONAL STATUS - GENERAL
DAILY ACTIVITIY SCORE: 24
MOBILITY SCORE: 24

## 2025-07-01 ASSESSMENT — PAIN SCALES - GENERAL: PAINLEVEL_OUTOF10: 3

## 2025-07-01 ASSESSMENT — PAIN DESCRIPTION - DESCRIPTORS: DESCRIPTORS: TIGHTNESS

## 2025-07-01 ASSESSMENT — PAIN - FUNCTIONAL ASSESSMENT: PAIN_FUNCTIONAL_ASSESSMENT: 0-10

## 2025-07-01 NOTE — CONSULTS
INFECTIOUS DISEASE INPATIENT INITIAL CONSULTATION    Referred By: Gabino Villalobos    Reason For Consult: Discharge recommendation for resolving pneumonia with Haemophilus influenza bacteremia     HPI:  This is a 30 y.o. female with PMH of kuous anticoagulant, cardiac thrombus, hx endocarditis who presented with sore throat and sinus congestion.    She has been found to have PNA due to H. Flu with bacteremia. Improving on IV Unasyn. No fevers now. WBC was 23K on admission and improved to 14.3 now.     She feels much better overall. Cough is minimal. OK with going home later today. Discussed importance of completing full abx course at home.     Allergies:  Coconut     Vitals (Last 24 Hours):  Heart Rate:  [87-99]   Resp:  [16-18]   BP: (125-140)/(81-87)   SpO2:  [92 %-94 %]      PHYSICAL EXAM:  Gen - NAD  Heart - RRR, no murmurs  Lungs - clear bilaterally, no wheezing  Abd - soft, no ttp, BS present  Ext - no LE edema  Skin - no rash    MEDS:  Current Medications[1]     LABS:  Lab Results   Component Value Date    WBC 14.3 (H) 06/30/2025    HGB 11.4 (L) 06/30/2025    HCT 33.3 (L) 06/30/2025    MCV 88 06/30/2025     06/30/2025      Results from last 72 hours   Lab Units 06/30/25  0807   SODIUM mmol/L 136   POTASSIUM mmol/L 3.6   CHLORIDE mmol/L 98   CO2 mmol/L 25   BUN mg/dL 4*   CREATININE mg/dL 0.52   GLUCOSE mg/dL 79   CALCIUM mg/dL 8.3*   ANION GAP mmol/L 17   EGFR mL/min/1.73m*2 >90     Results from last 72 hours   Lab Units 06/30/25  0807   ALBUMIN g/dL 3.0*     Estimated Creatinine Clearance: 125 mL/min (by C-G formula based on SCr of 0.52 mg/dL).      IMAGING:  CT/PE 6/25  Impression:     1.Pulmonary arteries are suboptimally opacified due to late  contrast bolus, but no large central pulmonary embolism. The  peripheral pulmonary arterials are poorly evaluated.  2.Patchy consolidation in the right middle lobe and right lower  lobe compatible with pneumonia.  3.Small right pleural effusion.        ASSESSMENT/PLAN:    PNA due to H. Flu with Bacteremia - resolving    OK to go home with PO Augmentin 875mg BID through 7/9/25.    Will sign off. Please call back with questions. Thanks!    Raji Henry MD  [facility]  Office #[phone]           [1]   Current Facility-Administered Medications:     albuterol 2.5 mg /3 mL (0.083 %) nebulizer solution 2.5 mg, 2.5 mg, nebulization, q2h PRN, Gabino Villalobos DO    ampicillin-sulbactam (Unasyn) 3 g in sodium chloride 0.9%  mL, 3 g, intravenous, q6h, Angel Sanches DO, Stopped at 07/01/25 0700    benzonatate (Tessalon) capsule 100 mg, 100 mg, oral, TID PRN, Leigh HutsonD, 100 mg at 06/28/25 0132    dextromethorphan-guaifenesin (Robitussin DM)  mg/5 mL oral liquid 5 mL, 5 mL, oral, q4h PRN, Angel Sanches DO, 5 mL at 06/28/25 1217    enoxaparin (Lovenox) syringe 40 mg, 40 mg, subcutaneous, q12h ASHLY, Angel Sanches DO, 40 mg at 06/30/25 2220    escitalopram (Lexapro) tablet 10 mg, 10 mg, oral, Daily, Ora Contreras MD, 10 mg at 06/30/25 0925    ondansetron (Zofran) injection 4 mg, 4 mg, intravenous, q4h PRN, Angel Sanches DO    oxyCODONE (Roxicodone) immediate release tablet 10 mg, 10 mg, oral, q4h PRN, Raheel G Salmatthewe, DO, 10 mg at 06/30/25 2257    oxyCODONE (Roxicodone) immediate release tablet 5 mg, 5 mg, oral, q4h PRN, Raheel G Salomone, DO    perflutren lipid microspheres (Definity) injection 0.5-10 mL of dilution, 0.5-10 mL of dilution, intravenous, Once in imaging, Angel C Bressi, DO    phenoL (Chloraseptic) 1.4 % mouth/throat spray 1 spray, 1 spray, Mouth/Throat, q2h PRN, Licha Myles, PharmD    polyethylene glycol (Glycolax, Miralax) packet 17 g, 17 g, oral, Daily, Angel XANDER Sanches DO, 17 g at 06/29/25 0915

## 2025-07-01 NOTE — PROGRESS NOTES
07/01/25 1301   Discharge Planning   Home or Post Acute Services None   Expected Discharge Disposition Home     Pt has dc order in. Will dc on PO Augmentin 875mg BID through 7/9/25. Family will drive pt home.

## 2025-07-01 NOTE — CARE PLAN
The patient's goals for the shift include      The clinical goals for the shift include PAIN CONTROL DURING SHIFT

## 2025-07-01 NOTE — DISCHARGE SUMMARY
Discharge Diagnosis  Pneumonia of right middle lobe due to infectious organism           Issues Requiring Follow-Up  Follow-up with PCP    Discharge Meds     Medication List      START taking these medications     amoxicillin-clavulanate 875-125 mg tablet; Commonly known as: Augmentin;   Take 1 tablet by mouth 2 times a day for 8 days.   benzonatate 100 mg capsule; Commonly known as: Tessalon; Take 1 capsule   (100 mg) by mouth 3 times a day as needed for cough. Do not crush or chew.   escitalopram 10 mg tablet; Commonly known as: Lexapro; Take 1 tablet (10   mg) by mouth once daily.; Start taking on: July 2, 2025     CONTINUE taking these medications     acetaminophen 500 mg tablet; Commonly known as: Tylenol   benzocaine-menthol lozenge; Commonly known as: Cepastat Sore Throat     STOP taking these medications     medroxyPROGESTERone 150 mg/mL injection; Commonly known as: Depo-Provera       Test Results Pending At Discharge  Pending Labs       Order Current Status    Extra Urine Gray Tube Collected (06/25/25 1306)    Troponin I Series, High Sensitivity (0, 1 HR) In process    Urinalysis with Reflex Culture and Microscopic In process    Blood Culture Preliminary result    Blood Culture Preliminary result            Hospital Course   Marian Villegas is a 30 YOF PMH lupus anticoagulant, cardiac thrombus, endocarditis, not on blood thinners currently, presenting with sore throat and shortness of breath.   Symptoms began 4 days ago with sore throat and sinus congestion. Following day developed non-productive cough, and SOB. Denies fever, chills, night sweats, chest pain, N/V, diaphoresis, abdominal pain or swelling of lower extremities.  She was afebrile, tachy 130s, tachypnea high 20s.  CBC notable for leukocytosis of 23.7 with bandemia.  CMP showed hypokalemia of 3.1, mild metabolic acidosis with bicarb of 20, normal AG. D-dimer 2300.  CTA chest negative for saddle embolus, but due to dye timing inconclusive of  smaller emboli. Did show pneumonia.  COVID negative.  Initial troponin 35, delta troponin 4.  EKG showed sinus tachycardia with old T wave inversions in V3-V4 when compared to EKG march 2023. BCX pending, giving rocephin and doxy and admitted for sepsis.    Patient was admitted for further management, and was treated with Unasyn 3 g IV piggyback every 6 hours.  Blood cultures shows haemophilus influenza sensitivity assessment Repeat blood cultures was negative.  Patient clinically improved with resolution of her symptoms, and she was  also seen by psychiatrist due to reported suicidal ideation prior to admission.  She was found to have recurrent major depression episodes, complicated bereavement, anxiety, possible PTSD from endocarditis hospitalization 2 years ago, and a history of cannabis use.  Recommendation was as below:  Lexapro 10 mg daily  Refer to [facility] for bereavement counseling  [address] [place] [id number]  [phone]   [email or web address]    On July 1, 2025, patient was found stable for discharge home on Augmentin 875/125 mg 1 tablet twice a day through 7/09, and follow-up with PCP, and as recommended by psychiatry services.  Prescription for Lexapro 10 mg daily provided at discharge.    Greater than 30 minutes were dedicated to this discharge that included educating patient and coordinating care.  Pertinent Physical Exam At Time of Discharge  Physical Exam  Constitutional: Well-developed, alert active, cooperative not in acute distress  Eyes: PERRLA, clear sclera  ENMT: Moist mucosal membranes, no exudate  Head / Neck: Atraumatic, normocephalic, supple neck, JVP not visualized  Lungs: Patent airways, CTABL  Heart: RRR, S1S2, no murmurs appreciated, palpable pulses in all extremities  GI: Soft, NT, ND, bowel sounds present in all quadrants  MSK: Moves all extremities freely, no restriction  of ROM, no joint edema  Extremities: Intact x 4, no peripheral edema  : No  Seals catheter inserted  Breast: Deferred  Neurological: AAO x 3 to person, place and date, facial muscles symmetrical, sensation intact, strength 4/4, no acute focal neurological deficits appreciated  Psychological: Appropriate mood and behavior  Outpatient Follow-Up  No future appointments.      Gabino Villalobos, DO

## 2025-07-03 LAB
BACTERIA BLD CULT: NORMAL
BACTERIA BLD CULT: NORMAL

## 2025-07-17 LAB
ATRIAL RATE: 131 BPM
P AXIS: 59 DEGREES
P OFFSET: 204 MS
P ONSET: 150 MS
PR INTERVAL: 136 MS
Q ONSET: 218 MS
QRS COUNT: 22 BEATS
QRS DURATION: 74 MS
QT INTERVAL: 304 MS
QTC CALCULATION(BAZETT): 448 MS
QTC FREDERICIA: 394 MS
R AXIS: 33 DEGREES
T AXIS: -16 DEGREES
T OFFSET: 370 MS
VENTRICULAR RATE: 131 BPM

## 2025-07-21 LAB
ATRIAL RATE: 95 BPM
P AXIS: 42 DEGREES
P OFFSET: 199 MS
P ONSET: 142 MS
PR INTERVAL: 148 MS
Q ONSET: 216 MS
QRS COUNT: 16 BEATS
QRS DURATION: 86 MS
QT INTERVAL: 350 MS
QTC CALCULATION(BAZETT): 439 MS
QTC FREDERICIA: 407 MS
R AXIS: 12 DEGREES
T AXIS: -5 DEGREES
T OFFSET: 391 MS
VENTRICULAR RATE: 95 BPM